# Patient Record
Sex: MALE | Race: WHITE | NOT HISPANIC OR LATINO | Employment: FULL TIME | URBAN - METROPOLITAN AREA
[De-identification: names, ages, dates, MRNs, and addresses within clinical notes are randomized per-mention and may not be internally consistent; named-entity substitution may affect disease eponyms.]

---

## 2018-05-23 DIAGNOSIS — R00.2 HEART PALPITATIONS: Primary | ICD-10-CM

## 2018-05-23 NOTE — PROGRESS NOTES
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

## 2020-12-30 ENCOUNTER — TELEPHONE (OUTPATIENT)
Dept: GASTROENTEROLOGY | Facility: CLINIC | Age: 35
End: 2020-12-30

## 2020-12-30 ENCOUNTER — OFFICE VISIT (OUTPATIENT)
Dept: GASTROENTEROLOGY | Facility: CLINIC | Age: 35
End: 2020-12-30
Payer: COMMERCIAL

## 2020-12-30 VITALS
BODY MASS INDEX: 29.62 KG/M2 | HEIGHT: 69 IN | WEIGHT: 200 LBS | SYSTOLIC BLOOD PRESSURE: 126 MMHG | DIASTOLIC BLOOD PRESSURE: 88 MMHG | HEART RATE: 92 BPM

## 2020-12-30 DIAGNOSIS — K62.5 RECTAL BLEEDING: Primary | ICD-10-CM

## 2020-12-30 DIAGNOSIS — R10.9 ABDOMINAL CRAMPING: ICD-10-CM

## 2020-12-30 PROCEDURE — 99214 OFFICE O/P EST MOD 30 MIN: CPT | Performed by: NURSE PRACTITIONER

## 2020-12-30 RX ORDER — MULTIVITAMIN
1 TABLET ORAL DAILY
COMMUNITY

## 2020-12-30 RX ORDER — SODIUM PICOSULFATE, MAGNESIUM OXIDE, AND ANHYDROUS CITRIC ACID 10; 3.5; 12 MG/160ML; G/160ML; G/160ML
LIQUID ORAL
Qty: 2 BOTTLE | Refills: 0 | Status: SHIPPED | OUTPATIENT
Start: 2020-12-30 | End: 2020-12-30 | Stop reason: SDUPTHER

## 2020-12-30 RX ORDER — SODIUM PICOSULFATE, MAGNESIUM OXIDE, AND ANHYDROUS CITRIC ACID 10; 3.5; 12 MG/160ML; G/160ML; G/160ML
LIQUID ORAL
Qty: 2 BOTTLE | Refills: 0 | Status: SHIPPED | OUTPATIENT
Start: 2020-12-30 | End: 2021-01-04 | Stop reason: HOSPADM

## 2021-01-04 ENCOUNTER — HOSPITAL ENCOUNTER (OUTPATIENT)
Dept: GASTROENTEROLOGY | Facility: AMBULATORY SURGERY CENTER | Age: 36
Discharge: HOME | End: 2021-01-04
Payer: COMMERCIAL

## 2021-01-04 ENCOUNTER — ANESTHESIA EVENT (OUTPATIENT)
Dept: GASTROENTEROLOGY | Facility: AMBULATORY SURGERY CENTER | Age: 36
End: 2021-01-04

## 2021-01-04 ENCOUNTER — ANESTHESIA (OUTPATIENT)
Dept: GASTROENTEROLOGY | Facility: AMBULATORY SURGERY CENTER | Age: 36
End: 2021-01-04

## 2021-01-04 VITALS
SYSTOLIC BLOOD PRESSURE: 115 MMHG | DIASTOLIC BLOOD PRESSURE: 57 MMHG | OXYGEN SATURATION: 100 % | TEMPERATURE: 97.2 F | HEART RATE: 60 BPM | RESPIRATION RATE: 12 BRPM

## 2021-01-04 VITALS — HEART RATE: 71 BPM

## 2021-01-04 DIAGNOSIS — R10.9 ABDOMINAL CRAMPING: ICD-10-CM

## 2021-01-04 DIAGNOSIS — K62.5 RECTAL BLEEDING: ICD-10-CM

## 2021-01-04 PROCEDURE — 45385 COLONOSCOPY W/LESION REMOVAL: CPT | Performed by: INTERNAL MEDICINE

## 2021-01-04 PROCEDURE — 45380 COLONOSCOPY AND BIOPSY: CPT | Performed by: INTERNAL MEDICINE

## 2021-01-04 RX ORDER — SODIUM CHLORIDE 9 MG/ML
50 INJECTION, SOLUTION INTRAVENOUS CONTINUOUS
Status: DISCONTINUED | OUTPATIENT
Start: 2021-01-04 | End: 2021-01-04

## 2021-01-04 RX ORDER — PROPOFOL 10 MG/ML
INJECTION, EMULSION INTRAVENOUS AS NEEDED
Status: DISCONTINUED | OUTPATIENT
Start: 2021-01-04 | End: 2021-01-04

## 2021-01-04 RX ADMIN — SODIUM CHLORIDE 50 ML/HR: 9 INJECTION, SOLUTION INTRAVENOUS at 07:37

## 2021-01-04 RX ADMIN — PROPOFOL 100 MG: 10 INJECTION, EMULSION INTRAVENOUS at 08:11

## 2021-01-04 RX ADMIN — PROPOFOL 50 MG: 10 INJECTION, EMULSION INTRAVENOUS at 08:20

## 2021-01-04 RX ADMIN — PROPOFOL 60 MG: 10 INJECTION, EMULSION INTRAVENOUS at 08:17

## 2021-01-04 RX ADMIN — PROPOFOL 50 MG: 10 INJECTION, EMULSION INTRAVENOUS at 08:23

## 2021-01-04 RX ADMIN — PROPOFOL 60 MG: 10 INJECTION, EMULSION INTRAVENOUS at 08:14

## 2021-01-04 NOTE — DISCHARGE INSTRUCTIONS
Hemorrhoids   WHAT YOU NEED TO KNOW:   What are hemorrhoids? Hemorrhoids are swollen blood vessels inside your rectum (internal hemorrhoids) or on your anus (external hemorrhoids)  Sometimes a hemorrhoid may prolapse  This means it extends out of your anus  What increases my risk for hemorrhoids? · Pregnancy or obesity    · Straining or sitting for a long time during bowel movements    · Liver disease    · Weak muscles around the anus caused by older age, rectal surgery, or anal intercourse    · A lack of physical activity    · Chronic diarrhea or constipation    · A low-fiber diet    What are the signs and symptoms of hemorrhoids? · Pain or itching around your anus or inside your rectum    · Swelling or bumps around your anus    · Bright red blood in your bowel movement, on the toilet paper, or in the toilet bowl    · Tissue bulging out of your anus (prolapsed hemorrhoids)    · Incontinence (poor control over urine or bowel movements)    How are hemorrhoids diagnosed? Your healthcare provider will ask about your symptoms, the foods you eat, and your bowel movements  He or she will examine your anus for external hemorrhoids  You may need the following:  · A digital rectal exam  is a test to check for hemorrhoids  Your healthcare provider will put a gloved finger inside your anus to feel for the hemorrhoids  · An anoscopy  is a test that uses a scope (small tube with a light and camera on the end) to look at your hemorrhoids  How are hemorrhoids treated? Treatment will depend on your symptoms  You may need any of the following:  · Medicines  can help decrease pain and swelling, and soften your bowel movement  The medicine may be a pill, pad, cream, or ointment  · Procedures  may be used to shrink or remove your hemorrhoid  Examples include rubber-band ligation, sclerotherapy, and photocoagulation  These procedures may be done in your healthcare provider's office   Ask your healthcare provider for more information about these procedures  · Surgery  may be needed to shrink or remove your hemorrhoids  How can I manage my symptoms? · Apply ice on your anus for 15 to 20 minutes every hour or as directed  Use an ice pack, or put crushed ice in a plastic bag  Cover it with a towel before you apply it to your anus  Ice helps prevent tissue damage and decreases swelling and pain  · Take a sitz bath  Fill a bathtub with 4 to 6 inches of warm water  You may also use a sitz bath pan that fits inside a toilet bowl  Sit in the sitz bath for 15 minutes  Do this 3 times a day, and after each bowel movement  The warm water can help decrease pain and swelling  · Keep your anal area clean  Gently wash the area with warm water daily  Soap may irritate the area  After a bowel movement, wipe with moist towelettes or wet toilet paper  Dry toilet paper can irritate the area  How can I help prevent hemorrhoids? · Do not strain to have a bowel movement  Do not sit on the toilet too long  These actions can increase pressure on the tissues in your rectum and anus  · Drink plenty of liquids  Liquids can help prevent constipation  Ask how much liquid to drink each day and which liquids are best for you  · Eat a variety of high-fiber foods  Examples include fruits, vegetables, and whole grains  Ask your healthcare provider how much fiber you need each day  You may need to take a fiber supplement  · Exercise as directed  Exercise, such as walking, may make it easier to have a bowel movement  Ask your healthcare provider to help you create an exercise plan  · Do not have anal sex  Anal sex can weaken the skin around your rectum and anus  · Avoid heavy lifting  This can cause straining and increase your risk for another hemorrhoid  When should I seek immediate care? · You have severe pain in your rectum or around your anus      · You have severe pain in your abdomen and you are vomiting  · You have bleeding from your anus that soaks through your underwear  When should I contact my healthcare provider? · You have frequent and painful bowel movements  · Your hemorrhoid looks or feels more swollen than usual      · You do not have a bowel movement for 2 days or more  · You see or feel tissue coming through your anus  · You have questions or concerns about your condition or care  CARE AGREEMENT:   You have the right to help plan your care  Learn about your health condition and how it may be treated  Discuss treatment options with your healthcare providers to decide what care you want to receive  You always have the right to refuse treatment  The above information is an  only  It is not intended as medical advice for individual conditions or treatments  Talk to your doctor, nurse or pharmacist before following any medical regimen to see if it is safe and effective for you  © Copyright [address] Information is for End User's use only and may not be sold, redistributed or otherwise used for commercial purposes  All illustrations and images included in CareNotes® are the copyrighted property of A D A M , Inc  or [address] [place] [address]  Colorectal Polyps   WHAT YOU NEED TO KNOW:   What are colorectal polyps? Colorectal polyps are small growths of tissue in the lining of the colon and rectum  Most polyps are hyperplastic polyps and are usually benign (noncancerous)  Certain types of polyps, called adenomatous polyps, may turn into cancer  What increases my risk of colorectal polyps? The exact cause of colorectal polyps is unknown   The following may increase your risk:  · Older age    · A diet of foods high in fat and low in fiber     · Family history of polyps    · Intestinal diseases, such as Crohn's disease or ulcerative colitis    · An unhealthy lifestyle, such as physical inactivity, smoking, or drinking alcohol    · Obesity    What are the signs and symptoms of colorectal polyps? · Blood in your bowel movement or bleeding from the rectum    · Change in bowel movement habits, such as diarrhea and constipation    · Abdominal pain    How are colorectal polyps diagnosed? You should have fecal blood screening once a year for colorectal disease if you are over 48years old  You should be screened earlier if you have an intestinal disease or a family history of polyps or colorectal cancer  During this screening, a sample of your bowel movement is checked for blood, which may be an early sign of colorectal polyps or cancer  You may also need any of the following tests:  · Digital rectal exam:  Your healthcare provider will examine your anus and use a finger to check your rectum for polyps  · Barium enema: A barium enema is an x-ray of the colon  A tube is put into your anus, and a liquid called barium is put through the tube  Barium is used so that healthcare providers can see your colon better on the x-ray film  · Virtual colonoscopy: This is a CT scan that takes pictures of the inside of your colon and rectum  A small, flexible tube is put into your rectum and air or carbon dioxide (gas) is used to expand your colon  This lets healthcare providers clearly see your colon and any polyps on a monitor  · Colonoscopy or sigmoidoscopy: These procedures help your healthcare provider see the inside of your colon using a flexible tube with a small light and camera on the end  During a sigmoidoscopy, your healthcare provider will only look at rectum and lower colon  During a colonoscopy, healthcare providers will look at the full length of your colon  Healthcare providers may remove a small amount of tissue from the colon for a biopsy  How are colorectal polyps treated? A polypectomy is a minimally invasive procedure to remove your polyps  They may be removed during a colonoscopy or sigmoidoscopy   Your healthcare provider may need to remove the polyps with a laparoscope  Laparoscopy is done by inserting a small, flexible scope into incisions made on your abdomen  What are the risks of colorectal polyps? You may bleed during a colonoscopy procedure  Your bowel may be perforated (torn) when polyps are removed  This may lead to an open abdominal surgery  During surgery, you may bleed too much or get an infection  Adenomatous polyps that are not removed may turn into cancer and become more difficult to treat  Where can I find support and more information? · Wilfrid Choi ([facility])  [address] [address]  Phone: [phone]  Web Address: Gloria Taylor  [facility] n[employer] go[employer]    When should I contact my healthcare provider? · You have a fever  · You have chills, a cough, or feel weak and achy  · You have abdominal pain that does not go away or gets worse after you take medicine  · Your abdomen is swollen  · You are losing weight without trying  · You have questions or concerns about your condition or care  When should I seek immediate care or call 911? · You have sudden shortness of breath  · You have a fast heart rate, fast breathing, or are too dizzy to stand up  · You have severe abdominal pain  · You see blood in your bowel movement  CARE AGREEMENT:   You have the right to help plan your care  Learn about your health condition and how it may be treated  Discuss treatment options with your healthcare providers to decide what care you want to receive  You always have the right to refuse treatment  The above information is an  only  It is not intended as medical advice for individual conditions or treatments  Talk to your doctor, nurse or pharmacist before following any medical regimen to see if it is safe and effective for you    © Copyright [address] Information is for End User's use only and may not be sold, redistributed or otherwise used for commercial purposes   All illustrations and images included in CareNotes® are the copyrighted property of A D A M , Inc  or [facility] Georgie Bradshaw

## 2021-01-04 NOTE — H&P
History and Physical -  [facility]  Tata Maharaj 30 y o  male MRN: [id number]                  HPI: Tata Maharaj is a 30y o  year old male who presents for rectal bleeding  REVIEW OF SYSTEMS: Per the HPI, and otherwise unremarkable  Historical Information   Past Medical History:   Diagnosis Date    Hidradenitis      Past Surgical History:   Procedure Laterality Date    CARPAL TUNNEL RELEASE Bilateral     FOOT SURGERY       Social History   Social History     Substance and Sexual Activity   Alcohol Use Yes    Frequency: Monthly or less     Social History     Substance and Sexual Activity   Drug Use Never     Social History     Tobacco Use   Smoking Status Former Smoker   Smokeless Tobacco Never Used     History reviewed  No pertinent family history  Meds/Allergies     Current Outpatient Medications:     Multiple Vitamin (multivitamin) tablet    Sod Picosulfate-Mag Ox-Cit Acd (Clenpiq) 10-3 5-12 MG-GM -GM/160ML SOLN    Current Facility-Administered Medications:     sodium chloride 0 9 % infusion, 50 mL/hr, Intravenous, Continuous, Continue from Pre-op at 01/04/21 0801    No Known Allergies    Objective     /82   Pulse 66   Temp (!) 97 2 °F (36 2 °C) (Temporal)   Resp 14   SpO2 99%       PHYSICAL EXAM    Gen: NAD AAOx3  CV: S1S2 RRR no m/r/g  CHEST: Clear b/l no c/r/w  ABD: +BS soft, NT/ND  EXT: no edema      ASSESSMENT/PLAN:  This is a 30y o  year old male here for colonoscopy, and he is stable and optimized for his procedure

## 2021-01-04 NOTE — ANESTHESIA PREPROCEDURE EVALUATION
Procedure:  COLONOSCOPY    Relevant Problems   No relevant active problems        Physical Exam    Airway    Mallampati score: II  TM Distance: >3 FB  Neck ROM: full     Dental   No notable dental hx     Cardiovascular  Cardiovascular exam normal    Pulmonary  Pulmonary exam normal     Other Findings        Anesthesia Plan  ASA Score- 2     Anesthesia Type- IV sedation with anesthesia with ASA Monitors  Additional Monitors:   Airway Plan:           Plan Factors-    Chart reviewed  Patient is not a current smoker  Induction- intravenous  Postoperative Plan-     Informed Consent- Anesthetic plan and risks discussed with patient

## 2021-01-04 NOTE — ANESTHESIA POSTPROCEDURE EVALUATION
Post-Op Assessment Note    CV Status:  Stable  Pain Score: 0    Pain management: adequate     Mental Status:  Alert and awake   Hydration Status:  Euvolemic and stable   PONV Controlled:  None   Airway Patency:  Patent      Post Op Vitals Reviewed: Yes      Staff: Anesthesiologist         No complications documented      BP      Temp     Pulse     Resp      SpO2

## 2021-01-06 ENCOUNTER — TELEPHONE (OUTPATIENT)
Dept: GASTROENTEROLOGY | Facility: CLINIC | Age: 36
End: 2021-01-06

## 2021-01-06 NOTE — TELEPHONE ENCOUNTER
Pt had a Colon done on 1/4/21 with GS  Pt needs a 4-6 week follow up  LM on VM for pt to call us back to schedule

## 2021-01-14 LAB
ALBUMIN SERPL-MCNC: 4.6 G/DL (ref 4–5)
ALBUMIN/GLOB SERPL: 2.1 {RATIO} (ref 1.2–2.2)
ALP SERPL-CCNC: 66 IU/L (ref 39–117)
ALT SERPL-CCNC: 25 IU/L (ref 0–44)
AST SERPL-CCNC: 16 IU/L (ref 0–40)
BASOPHILS # BLD AUTO: 0 X10E3/UL (ref 0–0.2)
BASOPHILS NFR BLD AUTO: 1 %
BILIRUB SERPL-MCNC: 0.2 MG/DL (ref 0–1.2)
BUN SERPL-MCNC: 12 MG/DL (ref 6–20)
BUN/CREAT SERPL: 14 (ref 9–20)
CALCIUM SERPL-MCNC: 9.6 MG/DL (ref 8.7–10.2)
CHLORIDE SERPL-SCNC: 101 MMOL/L (ref 96–106)
CO2 SERPL-SCNC: 26 MMOL/L (ref 20–29)
CREAT SERPL-MCNC: 0.86 MG/DL (ref 0.76–1.27)
CRP SERPL-MCNC: <1 MG/L (ref 0–10)
ENDOMYSIUM IGA SER QL: NEGATIVE
EOSINOPHIL # BLD AUTO: 0.1 X10E3/UL (ref 0–0.4)
EOSINOPHIL NFR BLD AUTO: 2 %
ERYTHROCYTE [DISTWIDTH] IN BLOOD BY AUTOMATED COUNT: 12.2 % (ref 11.6–15.4)
ERYTHROCYTE [SEDIMENTATION RATE] IN BLOOD BY WESTERGREN METHOD: 2 MM/HR (ref 0–15)
GLOBULIN SER-MCNC: 2.2 G/DL (ref 1.5–4.5)
GLUCOSE SERPL-MCNC: 104 MG/DL (ref 65–99)
HCT VFR BLD AUTO: 42.5 % (ref 37.5–51)
HGB BLD-MCNC: 15 G/DL (ref 13–17.7)
IGA SERPL-MCNC: 268 MG/DL (ref 90–386)
IMM GRANULOCYTES # BLD: 0 X10E3/UL (ref 0–0.1)
IMM GRANULOCYTES NFR BLD: 0 %
LYMPHOCYTES # BLD AUTO: 1.9 X10E3/UL (ref 0.7–3.1)
LYMPHOCYTES NFR BLD AUTO: 39 %
MCH RBC QN AUTO: 31.2 PG (ref 26.6–33)
MCHC RBC AUTO-ENTMCNC: 35.3 G/DL (ref 31.5–35.7)
MCV RBC AUTO: 88 FL (ref 79–97)
MONOCYTES # BLD AUTO: 0.5 X10E3/UL (ref 0.1–0.9)
MONOCYTES NFR BLD AUTO: 11 %
NEUTROPHILS # BLD AUTO: 2.2 X10E3/UL (ref 1.4–7)
NEUTROPHILS NFR BLD AUTO: 47 %
PLATELET # BLD AUTO: 343 X10E3/UL (ref 150–450)
POTASSIUM SERPL-SCNC: 4.6 MMOL/L (ref 3.5–5.2)
PROT SERPL-MCNC: 6.8 G/DL (ref 6–8.5)
RBC # BLD AUTO: 4.81 X10E6/UL (ref 4.14–5.8)
SL AMB EGFR AFRICAN AMERICAN: 131 ML/MIN/1.73
SL AMB EGFR NON AFRICAN AMERICAN: 113 ML/MIN/1.73
SODIUM SERPL-SCNC: 139 MMOL/L (ref 134–144)
T4 FREE SERPL DIALY-MCNC: 1.5 NG/DL
TSH SERPL-ACNC: 1.3 UU/ML
TTG IGA SER-ACNC: <2 U/ML (ref 0–3)
WBC # BLD AUTO: 4.7 X10E3/UL (ref 3.4–10.8)

## 2021-02-05 ENCOUNTER — OFFICE VISIT (OUTPATIENT)
Dept: GASTROENTEROLOGY | Facility: CLINIC | Age: 36
End: 2021-02-05
Payer: COMMERCIAL

## 2021-02-05 VITALS
SYSTOLIC BLOOD PRESSURE: 122 MMHG | DIASTOLIC BLOOD PRESSURE: 80 MMHG | BODY MASS INDEX: 30.07 KG/M2 | WEIGHT: 203 LBS | HEIGHT: 69 IN

## 2021-02-05 DIAGNOSIS — Z86.010 HISTORY OF COLON POLYPS: ICD-10-CM

## 2021-02-05 DIAGNOSIS — K59.04 CHRONIC IDIOPATHIC CONSTIPATION: Primary | ICD-10-CM

## 2021-02-05 DIAGNOSIS — K62.5 RECTAL BLEEDING: ICD-10-CM

## 2021-02-05 PROCEDURE — 99214 OFFICE O/P EST MOD 30 MIN: CPT | Performed by: INTERNAL MEDICINE

## 2021-02-05 RX ORDER — POLYETHYLENE GLYCOL 3350 17 G/17G
17 POWDER, FOR SOLUTION ORAL DAILY
Qty: 507 G | Refills: 2 | Status: SHIPPED | OUTPATIENT
Start: 2021-02-05

## 2021-02-05 NOTE — PATIENT INSTRUCTIONS
Constipation   WHAT YOU NEED TO KNOW:   Constipation is when you have hard, dry bowel movements, or you go longer than usual between bowel movements  DISCHARGE INSTRUCTIONS:   Call your doctor if:   · You have blood in your bowel movements  · You have a fever and abdominal pain with the constipation  · Your constipation gets worse  · You start to vomit  · You have questions or concerns about your condition or care  Medicines:   · Medicine  such as a laxative may help relax and loosen your intestines to help you have a bowel movement  Your provider may recommend you only use laxatives for a short time  Long-term use may make your bowels dependent on the medicine  · Take your medicine as directed  Contact your healthcare provider if you think your medicine is not helping or if you have side effects  Tell him of her if you are allergic to any medicine  Keep a list of the medicines, vitamins, and herbs you take  Include the amounts, and when and why you take them  Bring the list or the pill bottles to follow-up visits  Carry your medicine list with you in case of an emergency  Relieve constipation:   · A suppository  may be used to help soften your bowel movements  This may make them easier to pass  A suppository is guided into your rectum through your anus  · An enema  is liquid medicine used to clear bowel movement from your rectum  The medicine is put into your rectum through your anus  Prevent constipation:   · Drink liquids as directed  You may need to drink extra liquids to help soften and move your bowels  Ask how much liquid to drink each day and which liquids are best for you  · Eat high-fiber foods  This may help decrease constipation by adding bulk to your bowel movements  High-fiber foods include fruit, vegetables, whole-grain breads and cereals, and beans  Your healthcare provider or dietitian can help you create a high-fiber meal plan   Your provider may also recommend a fiber supplement if you cannot get enough fiber from food  · Exercise regularly  Regular physical activity can help stimulate your intestines  Walking is a good exercise to prevent or relieve constipation  Ask which exercises are best for you  · Schedule a time each day to have a bowel movement  This may help train your body to have regular bowel movements  Bend forward while you are on the toilet to help move the bowel movement out  Sit on the toilet for at least 10 minutes, even if you do not have a bowel movement  · Talk to your healthcare provider about your medicines  Certain medicines, such as opioids, can cause constipation  Your provider may be able to make medicine changes  For example, he or she may change the kind of medicine, or change when you take it  Follow up with your healthcare provider as directed:  Write down your questions so you remember to ask them during your visits  © Copyright [address] Information is for End User's use only and may not be sold, redistributed or otherwise used for commercial purposes  All illustrations and images included in CareNotes® are the copyrighted property of [employer]  or [facility] Georgie Bagley   The above information is an  only  It is not intended as medical advice for individual conditions or treatments  Talk to your doctor, nurse or pharmacist before following any medical regimen to see if it is safe and effective for you

## 2021-02-05 NOTE — PROGRESS NOTES
[id number] - Outpatient Follow-up Note  Dilia Rodrigues 30 y o  male MRN: [id number]  Encounter: [id number]    ASSESSMENT AND PLAN:      1  Chronic idiopathic constipation   56-year-old male with chronic constipation  Colonoscopy unremarkable  At this point, along with the Benefiber, recommend starting MiraLax every day  Increase dietary fiber and water intake  - polyethylene glycol (GLYCOLAX) 17 GM/SCOOP powder; Take 17 g by mouth daily  Dispense: 507 g; Refill: 2    2  Rectal bleeding  Rectal bleeding, colonoscopy unremarkable  This is likely secondary to hemorrhoids  Currently resolved, will continue to monitor  Can do banding if bleeding returns  3  History of colon polyps  Adenoma found on recent colonoscopy  Recall 5 years  Encouraged the patient to also let his first-degree relatives know that they will need a colonoscopy starting 22  Followup Appointment: 6 months  ______________________________________________________________________    Chief Complaint   Patient presents with    Follow-up     HPI:   56-year-old male no significant past medical history who presents today for follow-up  Colonoscopy was otherwise unremarkable except for some hemorrhoids  Still having some constipation issues  Rectal bleeding has resolved  Patient has adjusted his diet and is now eating more dietary fiber as well as drinking more water  Also started some Benefiber  Historical Information   Past Medical History:   Diagnosis Date    Hidradenitis      Past Surgical History:   Procedure Laterality Date    CARPAL TUNNEL RELEASE Bilateral     FOOT SURGERY       Social History     Substance and Sexual Activity   Alcohol Use Yes    Frequency: Monthly or less     Social History     Substance and Sexual Activity   Drug Use Never     Social History     Tobacco Use   Smoking Status Former Smoker   Smokeless Tobacco Never Used     History reviewed   No pertinent family history  Current Outpatient Medications:     Wheat Dextrin (BENEFIBER DRINK MIX PO)    Multiple Vitamin (multivitamin) tablet    polyethylene glycol (GLYCOLAX) 17 GM/SCOOP powder  No Known Allergies  Reviewed medications and allergies and updated as indicated    PHYSICAL EXAM:    Blood pressure 122/80, height 5' 9" (1 753 m), weight 92 1 kg (203 lb)  Body mass index is 29 98 kg/m²  General Appearance: NAD, cooperative, alert  Eyes: Anicteric, PERRLA, EOMI  ENT:  Normocephalic, atraumatic, normal mucosa  Neck:  Supple, symmetrical, trachea midline  Resp:  Clear to auscultation bilaterally; no rales, rhonchi or wheezing; respirations unlabored   CV:  S1 S2, Regular rate and rhythm; no murmur, rub, or gallop  GI:  Soft, non-tender, non-distended; normal bowel sounds; no masses, no organomegaly   Rectal: Deferred  Musculoskeletal: No cyanosis, clubbing or edema  Normal ROM  Skin:  No jaundice, rashes, or lesions   Heme/Lymph: No palpable cervical lymphadenopathy  Psych: Normal affect, good eye contact  Neuro: No gross deficits, AAOx3    Lab Results:   Lab Results   Component Value Date    WBC 4 7 01/06/2021    HGB 15 0 01/06/2021    HCT 42 5 01/06/2021    MCV 88 01/06/2021     01/06/2021     Lab Results   Component Value Date     05/18/2016    K 4 6 01/06/2021     01/06/2021    CO2 26 01/06/2021    BUN 12 01/06/2021    CREATININE 0 86 01/06/2021    CALCIUM 10 1 05/18/2016    AST 16 01/06/2021    ALT 25 01/06/2021    ALKPHOS 73 05/18/2016    PROT 6 8 05/18/2016    BILITOT 0 4 05/18/2016     No results found for: IRON, TIBC, FERRITIN  No results found for: LIPASE    Radiology Results:   No results found

## 2021-02-28 ENCOUNTER — APPOINTMENT (EMERGENCY)
Dept: RADIOLOGY | Facility: HOSPITAL | Age: 36
End: 2021-02-28
Payer: COMMERCIAL

## 2021-02-28 ENCOUNTER — HOSPITAL ENCOUNTER (EMERGENCY)
Facility: HOSPITAL | Age: 36
Discharge: HOME | End: 2021-03-01
Attending: EMERGENCY MEDICINE | Admitting: EMERGENCY MEDICINE
Payer: COMMERCIAL

## 2021-02-28 DIAGNOSIS — R00.2 PALPITATIONS: ICD-10-CM

## 2021-02-28 DIAGNOSIS — F41.9 ANXIETY: ICD-10-CM

## 2021-02-28 DIAGNOSIS — K21.9 GERD (GASTROESOPHAGEAL REFLUX DISEASE): Primary | ICD-10-CM

## 2021-02-28 LAB
BASOPHILS # BLD AUTO: 0.02 THOUSANDS/ΜL (ref 0–0.1)
BASOPHILS NFR BLD AUTO: 0 % (ref 0–1)
EOSINOPHIL # BLD AUTO: 0.11 THOUSAND/ΜL (ref 0–0.61)
EOSINOPHIL NFR BLD AUTO: 2 % (ref 0–6)
ERYTHROCYTE [DISTWIDTH] IN BLOOD BY AUTOMATED COUNT: 12.4 % (ref 11.6–15.1)
HCT VFR BLD AUTO: 45.3 % (ref 36.5–49.3)
HGB BLD-MCNC: 15.2 G/DL (ref 12–17)
IMM GRANULOCYTES # BLD AUTO: 0.01 THOUSAND/UL (ref 0–0.2)
IMM GRANULOCYTES NFR BLD AUTO: 0 % (ref 0–2)
LYMPHOCYTES # BLD AUTO: 3.09 THOUSANDS/ΜL (ref 0.6–4.47)
LYMPHOCYTES NFR BLD AUTO: 44 % (ref 14–44)
MCH RBC QN AUTO: 28.8 PG (ref 26.8–34.3)
MCHC RBC AUTO-ENTMCNC: 33.6 G/DL (ref 31.4–37.4)
MCV RBC AUTO: 86 FL (ref 82–98)
MONOCYTES # BLD AUTO: 0.92 THOUSAND/ΜL (ref 0.17–1.22)
MONOCYTES NFR BLD AUTO: 13 % (ref 4–12)
NEUTROPHILS # BLD AUTO: 2.85 THOUSANDS/ΜL (ref 1.85–7.62)
NEUTS SEG NFR BLD AUTO: 41 % (ref 43–75)
PLATELET # BLD AUTO: 349 THOUSANDS/UL (ref 149–390)
PMV BLD AUTO: 9.7 FL (ref 8.9–12.7)
RBC # BLD AUTO: 5.27 MILLION/UL (ref 3.88–5.62)
WBC # BLD AUTO: 7 THOUSAND/UL (ref 4.31–10.16)

## 2021-02-28 PROCEDURE — U0005 INFEC AGEN DETEC AMPLI PROBE: HCPCS | Performed by: EMERGENCY MEDICINE

## 2021-02-28 PROCEDURE — 93005 ELECTROCARDIOGRAM TRACING: CPT

## 2021-02-28 PROCEDURE — 99285 EMERGENCY DEPT VISIT HI MDM: CPT | Performed by: EMERGENCY MEDICINE

## 2021-02-28 PROCEDURE — 99285 EMERGENCY DEPT VISIT HI MDM: CPT

## 2021-02-28 PROCEDURE — 71045 X-RAY EXAM CHEST 1 VIEW: CPT

## 2021-02-28 PROCEDURE — 84484 ASSAY OF TROPONIN QUANT: CPT | Performed by: EMERGENCY MEDICINE

## 2021-02-28 PROCEDURE — 85025 COMPLETE CBC W/AUTO DIFF WBC: CPT | Performed by: EMERGENCY MEDICINE

## 2021-02-28 PROCEDURE — 36415 COLL VENOUS BLD VENIPUNCTURE: CPT | Performed by: EMERGENCY MEDICINE

## 2021-02-28 PROCEDURE — 80053 COMPREHEN METABOLIC PANEL: CPT | Performed by: EMERGENCY MEDICINE

## 2021-02-28 PROCEDURE — U0003 INFECTIOUS AGENT DETECTION BY NUCLEIC ACID (DNA OR RNA); SEVERE ACUTE RESPIRATORY SYNDROME CORONAVIRUS 2 (SARS-COV-2) (CORONAVIRUS DISEASE [COVID-19]), AMPLIFIED PROBE TECHNIQUE, MAKING USE OF HIGH THROUGHPUT TECHNOLOGIES AS DESCRIBED BY CMS-2020-01-R: HCPCS | Performed by: EMERGENCY MEDICINE

## 2021-03-01 VITALS
HEART RATE: 69 BPM | WEIGHT: 200 LBS | SYSTOLIC BLOOD PRESSURE: 114 MMHG | RESPIRATION RATE: 18 BRPM | BODY MASS INDEX: 29.53 KG/M2 | TEMPERATURE: 97.2 F | DIASTOLIC BLOOD PRESSURE: 79 MMHG | OXYGEN SATURATION: 94 %

## 2021-03-01 LAB
ALBUMIN SERPL BCP-MCNC: 4 G/DL (ref 3.5–5)
ALP SERPL-CCNC: 81 U/L (ref 46–116)
ALT SERPL W P-5'-P-CCNC: 40 U/L (ref 12–78)
ANION GAP SERPL CALCULATED.3IONS-SCNC: 9 MMOL/L (ref 4–13)
AST SERPL W P-5'-P-CCNC: 24 U/L (ref 5–45)
ATRIAL RATE: 71 BPM
BILIRUB SERPL-MCNC: 0.4 MG/DL (ref 0.2–1)
BUN SERPL-MCNC: 18 MG/DL (ref 5–25)
CALCIUM SERPL-MCNC: 8.7 MG/DL (ref 8.3–10.1)
CHLORIDE SERPL-SCNC: 104 MMOL/L (ref 100–108)
CO2 SERPL-SCNC: 27 MMOL/L (ref 21–32)
CREAT SERPL-MCNC: 0.98 MG/DL (ref 0.6–1.3)
GFR SERPL CREATININE-BSD FRML MDRD: 100 ML/MIN/1.73SQ M
GLUCOSE SERPL-MCNC: 103 MG/DL (ref 65–140)
P AXIS: 41 DEGREES
POTASSIUM SERPL-SCNC: 3.4 MMOL/L (ref 3.5–5.3)
PR INTERVAL: 158 MS
PROT SERPL-MCNC: 7.5 G/DL (ref 6.4–8.2)
QRS AXIS: -19 DEGREES
QRSD INTERVAL: 96 MS
QT INTERVAL: 384 MS
QTC INTERVAL: 417 MS
SODIUM SERPL-SCNC: 140 MMOL/L (ref 136–145)
T WAVE AXIS: 50 DEGREES
TROPONIN I SERPL-MCNC: <0.02 NG/ML
VENTRICULAR RATE: 71 BPM

## 2021-03-01 PROCEDURE — 93010 ELECTROCARDIOGRAM REPORT: CPT | Performed by: INTERNAL MEDICINE

## 2021-03-01 RX ORDER — POTASSIUM CHLORIDE 20 MEQ/1
40 TABLET, EXTENDED RELEASE ORAL ONCE
Status: COMPLETED | OUTPATIENT
Start: 2021-03-01 | End: 2021-03-01

## 2021-03-01 RX ADMIN — POTASSIUM CHLORIDE 40 MEQ: 1500 TABLET, EXTENDED RELEASE ORAL at 01:05

## 2021-03-01 NOTE — ED PROVIDER NOTES
History  Chief Complaint   Patient presents with    Shortness of Breath     pt states he has acid reflux for 1 week  and tonight he has that feeling and became sob  pt also states he had a BM and his stool was aron     This is a 61-year-old male presents for evaluation intermittent shortness of breath and palpitations for the past 12 months has a nonproductive cough he is a former smoker  He had a colonoscopy done last month for rectal bleeding which showed external hemorrhoids and polyp no other acute pathology he denies excessive alcohol or anti-inflammatory or anticoagulation use  He complains burning indigestion patient did take Pepto-Bismol last evening and noticed dark stools afterwards  Rectal exam here shows brown stool heme negative  Ambulatory O2 saturation is 97% and lungs are clear on examination normal sinus rhythm on monitor      History provided by:  Patient  Medical Problem  Location:  Epigastric  Quality:  Palpitations and reflux burning with shortness of breath  Severity:  Moderate  Onset quality:  Gradual  Duration:  12 months  Timing:  Intermittent  Chronicity:  Chronic  Context:  Intermittent palpitations and shortness of breath with reflux for the past 12 months   Relieved by:  Nothing  Worsened by: Anxiety  Associated symptoms: abdominal pain (Reflux), cough, fatigue and shortness of breath        Prior to Admission Medications   Prescriptions Last Dose Informant Patient Reported? Taking?    Multiple Vitamin (multivitamin) tablet  Self Yes No   Sig: Take 1 tablet by mouth daily   Wheat Dextrin (BENEFIBER DRINK MIX PO)   Yes No   Sig: Take 2 tablets by mouth   polyethylene glycol (GLYCOLAX) 17 GM/SCOOP powder   No No   Sig: Take 17 g by mouth daily      Facility-Administered Medications: None       Past Medical History:   Diagnosis Date    Acid reflux     Arthritis     Hidradenitis        Past Surgical History:   Procedure Laterality Date    CARPAL TUNNEL RELEASE Bilateral     FOOT SURGERY         History reviewed  No pertinent family history  I have reviewed and agree with the history as documented  E-Cigarette/Vaping    E-Cigarette Use Never User      E-Cigarette/Vaping Substances    Nicotine No     THC No     CBD No     Flavoring No     Other No     Unknown No      Social History     Tobacco Use    Smoking status: Former Smoker    Smokeless tobacco: Never Used   Substance Use Topics    Alcohol use: Yes     Frequency: Monthly or less    Drug use: Never       Review of Systems   Constitutional: Positive for fatigue  Respiratory: Positive for cough and shortness of breath  Cardiovascular: Positive for palpitations  Gastrointestinal: Positive for abdominal pain (Reflux)  Psychiatric/Behavioral: The patient is nervous/anxious  All other systems reviewed and are negative  Physical Exam  Physical Exam  Vitals signs and nursing note reviewed  Constitutional:       General: He is in acute distress (Anxious)  Appearance: He is not ill-appearing, toxic-appearing or diaphoretic  HENT:      Head: Normocephalic and atraumatic  Right Ear: External ear normal       Left Ear: External ear normal       Nose: Nose normal    Eyes:      General: No scleral icterus  Right eye: No discharge  Left eye: No discharge  Extraocular Movements: Extraocular movements intact  Pupils: Pupils are equal, round, and reactive to light  Neck:      Musculoskeletal: Normal range of motion and neck supple  No neck rigidity or muscular tenderness  Cardiovascular:      Rate and Rhythm: Normal rate and regular rhythm  Pulses: Normal pulses  Heart sounds: Normal heart sounds  No murmur  No friction rub  No gallop  Pulmonary:      Effort: Pulmonary effort is normal  No respiratory distress  Breath sounds: Normal breath sounds  No stridor  No wheezing, rhonchi or rales  Abdominal:      General: Abdomen is flat   Bowel sounds are normal  There is no distension  Tenderness: There is no abdominal tenderness  There is no guarding or rebound  Genitourinary:     Rectum: Guaiac result negative  Musculoskeletal: Normal range of motion  General: No swelling, tenderness, deformity or signs of injury  Right lower leg: No edema  Left lower leg: No edema  Skin:     General: Skin is warm and dry  Findings: No rash  Neurological:      General: No focal deficit present  Mental Status: He is alert and oriented to person, place, and time  Cranial Nerves: No cranial nerve deficit  Sensory: No sensory deficit  Coordination: Coordination normal    Psychiatric:         Thought Content:  Thought content normal       Comments: Anxious         Vital Signs  ED Triage Vitals [02/28/21 2310]   Temperature Pulse Respirations Blood Pressure SpO2   (!) 97 2 °F (36 2 °C) 72 18 144/77 98 %      Temp Source Heart Rate Source Patient Position - Orthostatic VS BP Location FiO2 (%)   Temporal Monitor Lying Right arm --      Pain Score       --           Vitals:    02/28/21 2310 02/28/21 2330   BP: 144/77 135/87   Pulse: 72 83   Patient Position - Orthostatic VS: Lying Lying         Visual Acuity      ED Medications  Medications   potassium chloride (K-DUR,KLOR-CON) CR tablet 40 mEq (has no administration in time range)       Diagnostic Studies  Results Reviewed     Procedure Component Value Units Date/Time    Troponin I [[id number]]  (Normal) Collected: 02/28/21 2341    Lab Status: Final result Specimen: Blood from Arm, Left Updated: 03/01/21 0012     Troponin I <0 02 ng/mL     Comprehensive metabolic panel [[id number]]  (Abnormal) Collected: 02/28/21 2341    Lab Status: Final result Specimen: Blood from Arm, Left Updated: 03/01/21 0009     Sodium 140 mmol/L      Potassium 3 4 mmol/L      Chloride 104 mmol/L      CO2 27 mmol/L      ANION GAP 9 mmol/L      BUN 18 mg/dL      Creatinine 0 98 mg/dL      Glucose 103 mg/dL      Calcium 8 7 mg/dL      AST 24 U/L      ALT 40 U/L      Alkaline Phosphatase 81 U/L      Total Protein 7 5 g/dL      Albumin 4 0 g/dL      Total Bilirubin 0 40 mg/dL      eGFR 100 ml/min/1 73sq m     Narrative:      [place] guidelines for Chronic Kidney Disease (CKD):     Stage 1 with normal or high GFR (GFR > 90 mL/min/1 73 square meters)    Stage 2 Mild CKD (GFR = 60-89 mL/min/1 73 square meters)    Stage 3A Moderate CKD (GFR = 45-59 mL/min/1 73 square meters)    Stage 3B Moderate CKD (GFR = 30-44 mL/min/1 73 square meters)    Stage 4 Severe CKD (GFR = 15-29 mL/min/1 73 square meters)    Stage 5 End Stage CKD (GFR <15 mL/min/1 73 square meters)  Note: GFR calculation is accurate only with a steady state creatinine    CBC and differential [[id number]]  (Abnormal) Collected: 02/28/21 2341    Lab Status: Final result Specimen: Blood from Arm, Left Updated: 02/28/21 2352     WBC 7 00 Thousand/uL      RBC 5 27 Million/uL      Hemoglobin 15 2 g/dL      Hematocrit 45 3 %      MCV 86 fL      MCH 28 8 pg      MCHC 33 6 g/dL      RDW 12 4 %      MPV 9 7 fL      Platelets 217 Thousands/uL      Neutrophils Relative 41 %      Immat GRANS % 0 %      Lymphocytes Relative 44 %      Monocytes Relative 13 %      Eosinophils Relative 2 %      Basophils Relative 0 %      Neutrophils Absolute 2 85 Thousands/µL      Immature Grans Absolute 0 01 Thousand/uL      Lymphocytes Absolute 3 09 Thousands/µL      Monocytes Absolute 0 92 Thousand/µL      Eosinophils Absolute 0 11 Thousand/µL      Basophils Absolute 0 02 Thousands/µL     Novel Coronavirus (COVID-19), PCR [facility] [[id number]] Collected: 02/28/21 2341    Lab Status:  In process Specimen: Nasopharyngeal Swab Updated: 02/28/21 2348                 XR chest 1 view portable   ED Interpretation by Daphney Wilson DO (03/01 1726)   No acute infiltrate pneumothorax or congestive heart failure                 Procedures  ECG 12 Lead Documentation Only    Date/Time: 2/28/2021 11:46 PM  Performed by: Michael Maharaj DO  Authorized by: Michael Maharaj DO     ECG reviewed by me, the ED Provider: yes    Patient location:  ED  Rhythm:     Rhythm: sinus rhythm    Conduction:     Conduction: normal    T waves:     T waves: non-specific               ED Course                                           MDM  Number of Diagnoses or Management Options  Diagnosis management comments: Shortness of breath and palpitations differential includes anxiety versus pneumonia COVID-19 anemia workup in progress including EKG chest x-ray and labs  Black stools by report was most likely secondary to him using Pepto-Bismol       Amount and/or Complexity of Data Reviewed  Clinical lab tests: ordered  Tests in the radiology section of CPT®: ordered        Disposition  Final diagnoses:   GERD (gastroesophageal reflux disease)   Palpitations   Anxiety     Time reflects when diagnosis was documented in both MDM as applicable and the Disposition within this note     Time User Action Codes Description Comment    2/28/2021 11:48 PM Naaman Like Add [K21 9] GERD (gastroesophageal reflux disease)     2/28/2021 11:48 PM Naaman Like Add [R00 2] Palpitations     2/28/2021 11:48 PM Naaman Like Add [F41 9] Anxiety       ED Disposition     ED Disposition Condition Date/Time Comment    Discharge Stable Mon Mar 1, 2021 12:44 AM Casper Crowe discharge to home/self care              Follow-up Information     Follow up With Specialties Details Why Contact Info Additional Information    Aleks Menon MD Family Medicine In 2 days  [address]  [place] [phone]        [address] Emergency Department Emergency Medicine  As needed [address] [place], [id number]  [address] [facility] Emergency Department, [address] [place] [address] [address]          Patient's Medications   Discharge Prescriptions    No medications on file     No discharge procedures on file      PDMP Review     None          ED Provider  Electronically Signed by           Daphney Wilson DO  03/01/21 7646

## 2021-03-01 NOTE — ED NOTES
Pt had colonoscopy a few weeks ago due to having blood in stool   Pt sattes he has a scheduled lung ct in few days and states he feels this may be some anxiety     Prudence CRISTIANO Monroy  02/28/21 6578

## 2021-03-02 LAB — SARS-COV-2 RNA SPEC QL NAA+PROBE: NOT DETECTED

## 2021-03-02 NOTE — RESULT ENCOUNTER NOTE
I called Meme John and let him know that his COVID-19 swab was negative  I advised him to continue social distancing procedures

## 2021-09-01 ENCOUNTER — OFFICE VISIT (OUTPATIENT)
Dept: GASTROENTEROLOGY | Facility: CLINIC | Age: 36
End: 2021-09-01
Payer: COMMERCIAL

## 2021-09-01 VITALS
SYSTOLIC BLOOD PRESSURE: 138 MMHG | HEIGHT: 69 IN | WEIGHT: 211 LBS | BODY MASS INDEX: 31.25 KG/M2 | DIASTOLIC BLOOD PRESSURE: 82 MMHG

## 2021-09-01 DIAGNOSIS — K62.5 RECTAL BLEEDING: ICD-10-CM

## 2021-09-01 DIAGNOSIS — K21.9 GASTROESOPHAGEAL REFLUX DISEASE, UNSPECIFIED WHETHER ESOPHAGITIS PRESENT: Primary | ICD-10-CM

## 2021-09-01 DIAGNOSIS — R10.84 GENERALIZED ABDOMINAL PAIN: ICD-10-CM

## 2021-09-01 DIAGNOSIS — Z86.010 HISTORY OF COLON POLYPS: ICD-10-CM

## 2021-09-01 DIAGNOSIS — K59.04 CHRONIC IDIOPATHIC CONSTIPATION: ICD-10-CM

## 2021-09-01 PROCEDURE — 99213 OFFICE O/P EST LOW 20 MIN: CPT | Performed by: NURSE PRACTITIONER

## 2021-09-01 NOTE — H&P (VIEW-ONLY)
[address] [facility] - Outpatient Follow-up Note  Gladys Buckner 30 y o  male MRN: [id number]  Encounter: [id number]    ASSESSMENT AND PLAN:      1  Gastroesophageal reflux disease, unspecified whether esophagitis present  Increased noted acid reflux from esophagus through epigastric area over the past 8 months  He would prefer to try lifestyle changes with his dietary intake and have EGD before requiring to take PPI  DDX:   GERD, esophagitis,  Forrester's  -  Schedule EGD at [address] [place] [address] not eat 2-3 hours prior to bedtime  -  Limit caffeine beverages and alcohol  -  Encourage fluid intake    2  Generalized abdominal pain  He states he has right-sided or left-sided abdominal discomfort periodically and can be sharp or dull  He admits to having a chaotic diet and open for lifestyle/dietary changes  -  Decreased dairy, greasy, fatty, or fried foods    3  Rectal bleeding  Resolved since he began using Miralax post colonoscopy earlier this year  4  Chronic idiopathic constipation  Resolved since initiating Miralax post colonoscopy earlier this year, he has titrated his use of Miralax and states his normal bowel movements are now soft and formed  5  History of colon polyps  Colonoscopy 01/04/2021;  5 and 9 mm polyps biopsied, internal hemorrhoid  Recall 5 years  Followup Appointment: 3-4 months  ______________________________________________________________________    Chief Complaint   Patient presents with    Follow-up     scope      HPI:  29-year-old male seen for  6 months follow-up colonoscopy for chronic idiopathic constipation and rectal bleeding  Colonoscopy 01/04/2021; 9 mm sessile polyp in cecum, 5 mm sessile polyp descending colon, small internal hemorrhoids  Biopsy sent  Patient denies nausea or vomiting  Since colonoscopy he takes Miralax every other day and states his bowel movements can be little chaotic and he contributes to his diet   He denies hematochezia or melena  He does note that he episodically has right or left-sided abdominal discomfort could be sharp can be dull and comes and goes  On exam, abdomen is soft and non-tender  Since colonoscopy he states he is little more aware of his body and states that he notices acid reflux from mid sternum up to his esophagus almost daily and again contributes to his diet  Discussed use of PPI, he would like to try to do food diary awareness and lifestyle changes before having to take a PPI  Discussed EGD and concerned that he has had noted acid reflux for approximately 8 months  Plan to schedule EGD  Historical Information   Past Medical History:   Diagnosis Date    Acid reflux     Arthritis     Hidradenitis      Past Surgical History:   Procedure Laterality Date    CARPAL TUNNEL RELEASE Bilateral     FOOT SURGERY       Social History     Substance and Sexual Activity   Alcohol Use Yes     Social History     Substance and Sexual Activity   Drug Use Never     Social History     Tobacco Use   Smoking Status Former Smoker   Smokeless Tobacco Never Used     History reviewed  No pertinent family history  Current Outpatient Medications:     Multiple Vitamin (multivitamin) tablet    Wheat Dextrin (BENEFIBER DRINK MIX PO)    polyethylene glycol (GLYCOLAX) 17 GM/SCOOP powder  No Known Allergies  Reviewed medications and allergies and updated as indicated    PHYSICAL EXAM:    Blood pressure 138/82, height 5' 9" (1 753 m), weight 95 7 kg (211 lb)  Body mass index is 31 16 kg/m²  General Appearance: NAD, cooperative, alert  Eyes: Anicteric, PERRLA, EOMI  ENT:  Normocephalic, atraumatic, normal mucosa  Neck:  Supple, symmetrical, trachea midline  Resp:  Clear to auscultation bilaterally; no rales, rhonchi or wheezing; respirations unlabored   CV:  S1 S2, Regular rate and rhythm; no murmur, rub, or gallop    GI:  Soft, non-tender, non-distended; normal bowel sounds; no masses, no organomegaly   Rectal: Deferred  Musculoskeletal: No cyanosis, clubbing or edema  Normal ROM  Skin:  No jaundice, rashes, or lesions   Heme/Lymph: No palpable cervical lymphadenopathy  Psych: Normal affect, good eye contact  Neuro: No gross deficits, AAOx3    Lab Results:   Lab Results   Component Value Date    WBC 7 00 02/28/2021    HGB 15 2 02/28/2021    HCT 45 3 02/28/2021    MCV 86 02/28/2021     02/28/2021     Lab Results   Component Value Date     05/18/2016    K 3 4 (L) 02/28/2021     02/28/2021    CO2 27 02/28/2021    BUN 18 02/28/2021    CREATININE 0 98 02/28/2021    CALCIUM 8 7 02/28/2021    AST 24 02/28/2021    ALT 40 02/28/2021    ALKPHOS 81 02/28/2021    PROT 6 8 05/18/2016    BILITOT 0 4 05/18/2016    EGFR 100 02/28/2021     No results found for: IRON, TIBC, FERRITIN  No results found for: LIPASE    Radiology Results:   No results found

## 2021-09-01 NOTE — PROGRESS NOTES
[address] [facility] - Outpatient Follow-up Note  Immanuel Winston 30 y o  male MRN: [id number]  Encounter: [id number]    ASSESSMENT AND PLAN:      1  Gastroesophageal reflux disease, unspecified whether esophagitis present  Increased noted acid reflux from esophagus through epigastric area over the past 8 months  He would prefer to try lifestyle changes with his dietary intake and have EGD before requiring to take PPI  DDX:   GERD, esophagitis,  Forrester's  -  Schedule EGD at [address] [place] [address] not eat 2-3 hours prior to bedtime  -  Limit caffeine beverages and alcohol  -  Encourage fluid intake    2  Generalized abdominal pain  He states he has right-sided or left-sided abdominal discomfort periodically and can be sharp or dull  He admits to having a chaotic diet and open for lifestyle/dietary changes  -  Decreased dairy, greasy, fatty, or fried foods    3  Rectal bleeding  Resolved since he began using Miralax post colonoscopy earlier this year  4  Chronic idiopathic constipation  Resolved since initiating Miralax post colonoscopy earlier this year, he has titrated his use of Miralax and states his normal bowel movements are now soft and formed  5  History of colon polyps  Colonoscopy 01/04/2021;  5 and 9 mm polyps biopsied, internal hemorrhoid  Recall 5 years  Followup Appointment: 3-4 months  ______________________________________________________________________    Chief Complaint   Patient presents with    Follow-up     scope      HPI:  21-year-old male seen for  6 months follow-up colonoscopy for chronic idiopathic constipation and rectal bleeding  Colonoscopy 01/04/2021; 9 mm sessile polyp in cecum, 5 mm sessile polyp descending colon, small internal hemorrhoids  Biopsy sent  Patient denies nausea or vomiting  Since colonoscopy he takes Miralax every other day and states his bowel movements can be little chaotic and he contributes to his diet   He denies hematochezia or melena  He does note that he episodically has right or left-sided abdominal discomfort could be sharp can be dull and comes and goes  On exam, abdomen is soft and non-tender  Since colonoscopy he states he is little more aware of his body and states that he notices acid reflux from mid sternum up to his esophagus almost daily and again contributes to his diet  Discussed use of PPI, he would like to try to do food diary awareness and lifestyle changes before having to take a PPI  Discussed EGD and concerned that he has had noted acid reflux for approximately 8 months  Plan to schedule EGD  Historical Information   Past Medical History:   Diagnosis Date    Acid reflux     Arthritis     Hidradenitis      Past Surgical History:   Procedure Laterality Date    CARPAL TUNNEL RELEASE Bilateral     FOOT SURGERY       Social History     Substance and Sexual Activity   Alcohol Use Yes     Social History     Substance and Sexual Activity   Drug Use Never     Social History     Tobacco Use   Smoking Status Former Smoker   Smokeless Tobacco Never Used     History reviewed  No pertinent family history  Current Outpatient Medications:     Multiple Vitamin (multivitamin) tablet    Wheat Dextrin (BENEFIBER DRINK MIX PO)    polyethylene glycol (GLYCOLAX) 17 GM/SCOOP powder  No Known Allergies  Reviewed medications and allergies and updated as indicated    PHYSICAL EXAM:    Blood pressure 138/82, height 5' 9" (1 753 m), weight 95 7 kg (211 lb)  Body mass index is 31 16 kg/m²  General Appearance: NAD, cooperative, alert  Eyes: Anicteric, PERRLA, EOMI  ENT:  Normocephalic, atraumatic, normal mucosa  Neck:  Supple, symmetrical, trachea midline  Resp:  Clear to auscultation bilaterally; no rales, rhonchi or wheezing; respirations unlabored   CV:  S1 S2, Regular rate and rhythm; no murmur, rub, or gallop    GI:  Soft, non-tender, non-distended; normal bowel sounds; no masses, no organomegaly   Rectal: Deferred  Musculoskeletal: No cyanosis, clubbing or edema  Normal ROM  Skin:  No jaundice, rashes, or lesions   Heme/Lymph: No palpable cervical lymphadenopathy  Psych: Normal affect, good eye contact  Neuro: No gross deficits, AAOx3    Lab Results:   Lab Results   Component Value Date    WBC 7 00 02/28/2021    HGB 15 2 02/28/2021    HCT 45 3 02/28/2021    MCV 86 02/28/2021     02/28/2021     Lab Results   Component Value Date     05/18/2016    K 3 4 (L) 02/28/2021     02/28/2021    CO2 27 02/28/2021    BUN 18 02/28/2021    CREATININE 0 98 02/28/2021    CALCIUM 8 7 02/28/2021    AST 24 02/28/2021    ALT 40 02/28/2021    ALKPHOS 81 02/28/2021    PROT 6 8 05/18/2016    BILITOT 0 4 05/18/2016    EGFR 100 02/28/2021     No results found for: IRON, TIBC, FERRITIN  No results found for: LIPASE    Radiology Results:   No results found

## 2021-09-09 ENCOUNTER — ANESTHESIA (OUTPATIENT)
Dept: GASTROENTEROLOGY | Facility: AMBULATORY SURGERY CENTER | Age: 36
End: 2021-09-09

## 2021-09-09 ENCOUNTER — ANESTHESIA EVENT (OUTPATIENT)
Dept: GASTROENTEROLOGY | Facility: AMBULATORY SURGERY CENTER | Age: 36
End: 2021-09-09

## 2021-09-09 ENCOUNTER — HOSPITAL ENCOUNTER (OUTPATIENT)
Dept: GASTROENTEROLOGY | Facility: AMBULATORY SURGERY CENTER | Age: 36
Discharge: HOME | End: 2021-09-09
Payer: COMMERCIAL

## 2021-09-09 VITALS
DIASTOLIC BLOOD PRESSURE: 62 MMHG | OXYGEN SATURATION: 97 % | TEMPERATURE: 98.2 F | HEART RATE: 57 BPM | SYSTOLIC BLOOD PRESSURE: 105 MMHG | RESPIRATION RATE: 20 BRPM

## 2021-09-09 DIAGNOSIS — R10.84 GENERALIZED ABDOMINAL PAIN: ICD-10-CM

## 2021-09-09 DIAGNOSIS — K21.9 GASTROESOPHAGEAL REFLUX DISEASE, UNSPECIFIED WHETHER ESOPHAGITIS PRESENT: ICD-10-CM

## 2021-09-09 PROCEDURE — 43239 EGD BIOPSY SINGLE/MULTIPLE: CPT | Performed by: INTERNAL MEDICINE

## 2021-09-09 RX ORDER — PROPOFOL 10 MG/ML
INJECTION, EMULSION INTRAVENOUS AS NEEDED
Status: DISCONTINUED | OUTPATIENT
Start: 2021-09-09 | End: 2021-09-09

## 2021-09-09 RX ORDER — SODIUM CHLORIDE 9 MG/ML
INJECTION, SOLUTION INTRAVENOUS CONTINUOUS PRN
Status: DISCONTINUED | OUTPATIENT
Start: 2021-09-09 | End: 2021-09-09

## 2021-09-09 RX ORDER — SODIUM CHLORIDE, SODIUM LACTATE, POTASSIUM CHLORIDE, CALCIUM CHLORIDE 600; 310; 30; 20 MG/100ML; MG/100ML; MG/100ML; MG/100ML
50 INJECTION, SOLUTION INTRAVENOUS CONTINUOUS
Status: DISCONTINUED | OUTPATIENT
Start: 2021-09-09 | End: 2021-09-13 | Stop reason: HOSPADM

## 2021-09-09 RX ADMIN — PROPOFOL 30 MG: 10 INJECTION, EMULSION INTRAVENOUS at 15:06

## 2021-09-09 RX ADMIN — SODIUM CHLORIDE: 9 INJECTION, SOLUTION INTRAVENOUS at 14:52

## 2021-09-09 RX ADMIN — PROPOFOL 30 MG: 10 INJECTION, EMULSION INTRAVENOUS at 15:02

## 2021-09-09 RX ADMIN — PROPOFOL 30 MG: 10 INJECTION, EMULSION INTRAVENOUS at 15:04

## 2021-09-09 RX ADMIN — PROPOFOL 150 MG: 10 INJECTION, EMULSION INTRAVENOUS at 15:01

## 2021-09-09 RX ADMIN — SODIUM CHLORIDE, SODIUM LACTATE, POTASSIUM CHLORIDE, CALCIUM CHLORIDE 50 ML/HR: 600; 310; 30; 20 INJECTION, SOLUTION INTRAVENOUS at 14:23

## 2021-09-09 NOTE — ANESTHESIA POSTPROCEDURE EVALUATION
Post-Op Assessment Note    CV Status:  Stable  Pain Score: 0    Pain management: adequate     Mental Status:  Alert and awake   Hydration Status:  Euvolemic   PONV Controlled:  Controlled   Airway Patency:  Patent      Post Op Vitals Reviewed: Yes      Staff: CRNA         No complications documented      BP   97/59   Temp      Pulse  69   Resp   18   SpO2   97%

## 2021-09-09 NOTE — INTERVAL H&P NOTE
H&P reviewed  After examining the patient I find no changes in the patients condition since the H&P had been written      Vitals:    09/09/21 1409   BP: 132/82   Pulse: 68   Resp: 13   Temp: 98 2 °F (36 8 °C)   SpO2: 96%

## 2021-09-09 NOTE — ANESTHESIA PREPROCEDURE EVALUATION
Procedure:  EGD    Relevant Problems   GI/HEPATIC   (+) Gastroesophageal reflux disease        Physical Exam    Airway    Mallampati score: II  TM Distance: >3 FB  Neck ROM: full     Dental   No notable dental hx     Cardiovascular  Cardiovascular exam normal    Pulmonary  Pulmonary exam normal     Other Findings        Anesthesia Plan  ASA Score- 2     Anesthesia Type- IV sedation with anesthesia with ASA Monitors  Additional Monitors:   Airway Plan:           Plan Factors-    Chart reviewed  Patient is not a current smoker  Induction- intravenous  Postoperative Plan-     Informed Consent- Anesthetic plan and risks discussed with patient  I personally reviewed this patient with the CRNA  Discussed and agreed on the Anesthesia Plan with the CRNA  Yoav Leblanc

## 2021-09-09 NOTE — DISCHARGE INSTRUCTIONS
Upper Endoscopy   WHAT YOU NEED TO KNOW:   An upper endoscopy is also called an upper gastrointestinal (GI) endoscopy, or an esophagogastroduodenoscopy (EGD)  It is a procedure to examine the inside of your esophagus, stomach, and duodenum (first part of the small intestine) with a scope  You may feel bloated, gassy, or have some abdominal discomfort after your procedure  Your throat may be sore for 24 to 36 hours  You may burp or pass gas from air that is still inside your body  DISCHARGE INSTRUCTIONS:   Seek care immediately if:    You have sudden, severe abdominal pain   You have problems swallowing   You have a large amount of black, sticky bowel movements or blood in your bowel movements   You have sudden trouble breathing   You feel weak, lightheaded, or faint or your heart beats faster than normal for you  Contact your healthcare provider if:    You have a fever and chills   You have nausea or are vomiting   Your abdomen is bloated or feels full and hard   You have abdominal pain   You have black, sticky bowel movements or blood in your bowel movements   You have not had a bowel movement for 3 days after your procedure   You have rash or hives   You have questions or concerns about your procedure  Activity:    Do not lift, strain, or run for 24 hours after your procedure   Rest after your procedure  You have been given medicine to relax you  Do not drive or make important decisions until the day after your procedure  Return to your normal activity as directed   Relieve gas and discomfort from bloating by lying on your right side with a heating pad on your abdomen  You may need to take short walks to help the gas move out  Eat small meals until bloating is relieved  Follow up with your healthcare provider as directed: Write down your questions so you remember to ask them during your visits       If you take a blood thinner, please review the specific instructions from your endoscopist about when you should resume it  These can be found in the Recommendation and Your Medication list sections of this After Visit Summary  Esophagitis   WHAT YOU NEED TO KNOW:   What is esophagitis? Esophagitis is inflammation or irritation of the lining of the esophagus  What causes esophagitis? The most common cause is acid reflux  Reflux means stomach acid backs up into your esophagus  The following can also cause esophagitis:  · An infection from bacteria, a virus, or a fungus    · Vomiting or a hiatal hernia    · Medicines such as aspirin or NSAIDs    · Large pills taken without enough water or right before you go to bed    · Cancer treatment, such as radiation    · A toxic object you swallowed, such as a button battery, that gets stuck in your esophagus    · Too much caffeine or acidic or spicy foods    · Smoking cigarettes    What are the signs and symptoms of esophagitis? Signs and symptoms depend on the cause:  · Pain in the middle of your chest that may spread to your back    · Burning or pain in your esophagus, abdominal pain, or indigestion    · Trouble swallowing, or pain when you swallow    · A feeling that something is stuck in your esophagus    · Sore throat, a cough, or hoarseness    · Gagging, drooling, or wheezing    · Mouth sores (white patches), a bad taste in your mouth, or bad breath    · Nausea or vomiting    · Feeding problems or failure to thrive (young children)    How is esophagitis diagnosed? Your healthcare provider will ask about your symptoms and when they started  Tell him or her if anything makes your symptoms worse or better  You may need any of the following:  · Endoscopy  is used to find any tissue changes  A scope is used to see inside the esophagus  A scope is a long, bendable tube with a light on the end  The scope is placed in your mouth and passed down your throat and esophagus   A camera may be hooked to the scope to take pictures  · A barium swallow x-ray  is used to take pictures inside your esophagus  You will swallow barium in a thick liquid before you have the x-ray  The barium helps any injuries show up better on the x-rays  How is esophagitis treated? The goal of treatment is to help the lining of your esophagus heal and to prevent serious complications  Treatment will depend on what is causing your esophagitis  Symptoms caused by a toxic object such as a button battery need immediate treatment  Less severe causes may not need treatment  You may need any of the following if symptoms continue or get worse:  · Medicines  may be given to fight infection or to control stomach acid  Your healthcare provider may make changes to your medicines, such as changing it to a liquid form  · An elimination diet  may help you find foods that are causing your symptoms  You will stop eating certain foods that can cause esophagitis  Your healthcare provider will tell you to start eating them again one at a time  Each time you do not have symptoms, you will start eating another food from the list  Any food that does cause symptoms may be causing your esophagitis  · Surgery  may be needed if other treatments do not work  Part of your stomach can be wrapped to cover the valve between your stomach and esophagus  This helps prevent acid from backing up into your esophagus  What can I do to manage or prevent esophagitis? · Do not smoke  Nicotine and other chemicals in cigarettes and cigars can irritate and damage your esophagus  Ask your healthcare provider for information if you currently smoke and need help to quit  E-cigarettes or smokeless tobacco still contain nicotine  Talk to your healthcare provider before you use these products  · Do not drink alcohol  Alcohol can irritate your esophagus  Talk to your healthcare provider if you need help to stop drinking      · Limit or do not eat foods that can lead to esophagitis  Foods such as oranges and salsa can irritate your esophagus  Caffeine and chocolate can cause acid reflux  High-fat and fried foods make your stomach digest food more slowly  This increases the amount of stomach acid your esophagus is exposed to  Eat small meals, and drink water with your meals  Soft foods such as yogurt and applesauce may help soothe your throat  Do not eat for at least 3 hours before you go to bed  · Drink more liquid when you take pills  Drink a full glass of water when you take your pills  Ask your healthcare provider if you can take your pills at least an hour before you go to bed  · Prevent acid reflux  Do not bend over unless it is necessary  Acid may back up into your esophagus when you bend over  If possible, keep the head of your bed elevated while you sleep  This will help keep acid from backing up  Manage stress  Stress can make your symptoms worse or cause stomach acid to back up  · Keep batteries and similar objects out of the reach of children  Babies often put items in their mouths to explore them  Button batteries are easy to swallow and can cause serious damage  Keep the battery covers of electronic devices such as remote controls taped closed  Store all batteries and toxic materials where children cannot get to them  Use childproof locks to keep children away from dangerous materials  Call your local emergency number (911 in the [facility]) for any of the following:   · You have any of the following signs of a heart attack:      ? Squeezing, pressure, or pain in your chest    ? You may  also have any of the following:     § Discomfort or pain in your back, neck, jaw, stomach, or arm    § Shortness of breath    § Nausea or vomiting    § Lightheadedness or a sudden cold sweat      When should I seek immediate care? · You feel like you have food stuck in your throat and you cannot cough it out  When should I call my doctor?    · You have new or worsening symptoms, even after treatment  · You have questions or concerns about your condition or care  CARE AGREEMENT:   You have the right to help plan your care  Learn about your health condition and how it may be treated  Discuss treatment options with your healthcare providers to decide what care you want to receive  You always have the right to refuse treatment  The above information is an  only  It is not intended as medical advice for individual conditions or treatments  Talk to your doctor, nurse or pharmacist before following any medical regimen to see if it is safe and effective for you  © Copyright [employer] 2021 Information is for End User's use only and may not be sold, redistributed or otherwise used for commercial purposes   All illustrations and images included in CareNotes® are the copyrighted property of A D A M , [employer]  or [address] [place], [place] [address]jelly

## 2021-11-07 ENCOUNTER — HOSPITAL ENCOUNTER (EMERGENCY)
Facility: HOSPITAL | Age: 36
Discharge: HOME | End: 2021-11-08
Attending: EMERGENCY MEDICINE
Payer: COMMERCIAL

## 2021-11-07 VITALS
OXYGEN SATURATION: 98 % | SYSTOLIC BLOOD PRESSURE: 119 MMHG | HEART RATE: 84 BPM | TEMPERATURE: 99.4 F | RESPIRATION RATE: 18 BRPM | HEIGHT: 70 IN | WEIGHT: 206 LBS | DIASTOLIC BLOOD PRESSURE: 71 MMHG | BODY MASS INDEX: 29.49 KG/M2

## 2021-11-07 DIAGNOSIS — J02.9 ACUTE PHARYNGITIS: Primary | ICD-10-CM

## 2021-11-07 DIAGNOSIS — U07.1 COVID: ICD-10-CM

## 2021-11-07 LAB
FLUAV RNA RESP QL NAA+PROBE: NEGATIVE
FLUBV RNA RESP QL NAA+PROBE: NEGATIVE
RSV RNA RESP QL NAA+PROBE: NEGATIVE
S PYO DNA THROAT QL NAA+PROBE: NORMAL
SARS-COV-2 RNA RESP QL NAA+PROBE: POSITIVE

## 2021-11-07 PROCEDURE — 0241U HB NFCT DS VIR RESP RNA 4 TRGT: CPT | Performed by: EMERGENCY MEDICINE

## 2021-11-07 PROCEDURE — 99282 EMERGENCY DEPT VISIT SF MDM: CPT | Performed by: EMERGENCY MEDICINE

## 2021-11-07 PROCEDURE — 99283 EMERGENCY DEPT VISIT LOW MDM: CPT

## 2021-11-07 PROCEDURE — 87651 STREP A DNA AMP PROBE: CPT | Performed by: EMERGENCY MEDICINE

## 2022-06-15 ENCOUNTER — HOSPITAL ENCOUNTER (EMERGENCY)
Facility: HOSPITAL | Age: 37
Discharge: HOME | End: 2022-06-15
Attending: EMERGENCY MEDICINE
Payer: COMMERCIAL

## 2022-06-15 ENCOUNTER — APPOINTMENT (EMERGENCY)
Dept: RADIOLOGY | Facility: HOSPITAL | Age: 37
End: 2022-06-15

## 2022-06-15 VITALS
BODY MASS INDEX: 25.62 KG/M2 | SYSTOLIC BLOOD PRESSURE: 133 MMHG | WEIGHT: 178.57 LBS | TEMPERATURE: 98.2 F | RESPIRATION RATE: 15 BRPM | DIASTOLIC BLOOD PRESSURE: 74 MMHG | OXYGEN SATURATION: 96 % | HEART RATE: 75 BPM

## 2022-06-15 DIAGNOSIS — S61.210A LACERATION OF RIGHT INDEX FINGER: Primary | ICD-10-CM

## 2022-06-15 PROCEDURE — 99282 EMERGENCY DEPT VISIT SF MDM: CPT

## 2022-06-15 PROCEDURE — 73140 X-RAY EXAM OF FINGER(S): CPT

## 2022-06-15 PROCEDURE — 99283 EMERGENCY DEPT VISIT LOW MDM: CPT

## 2022-06-15 PROCEDURE — 12001 RPR S/N/AX/GEN/TRNK 2.5CM/<: CPT

## 2022-06-15 RX ORDER — LIDOCAINE HYDROCHLORIDE 10 MG/ML
5 INJECTION, SOLUTION EPIDURAL; INFILTRATION; INTRACAUDAL; PERINEURAL ONCE
Status: COMPLETED | OUTPATIENT
Start: 2022-06-15 | End: 2022-06-15

## 2022-06-15 RX ORDER — GINSENG 100 MG
1 CAPSULE ORAL ONCE
Status: COMPLETED | OUTPATIENT
Start: 2022-06-15 | End: 2022-06-15

## 2022-06-15 RX ADMIN — BACITRACIN ZINC 1 SMALL APPLICATION: 500 OINTMENT TOPICAL at 10:43

## 2022-06-15 RX ADMIN — LIDOCAINE HYDROCHLORIDE 5 ML: 10 INJECTION, SOLUTION EPIDURAL; INFILTRATION; INTRACAUDAL at 10:43

## 2022-06-15 NOTE — DISCHARGE INSTRUCTIONS
Keep the sutures dry for 2 days   After 2 days you may clean with soap and water to be careful not to pull the stitches out  Use OTC motrin or tylenol for pain relief  Call to schedule an appointment with your PCP for suture removal in one week  Return to the ED if the area starts rebleeding and is uncontrolled with pressure > 10 minutes, your sutures come out, the area becomes red, hot, swollen or has red streaking, you get fevers, or the area had purulent drainage

## 2022-06-15 NOTE — ED PROVIDER NOTES
History  Chief Complaint   Patient presents with    Finger Laceration     To ED with c/o right index finger laceration from a saw while working with wood this morning  30 y/o male presents to ED today with complaints of finger laceration sustained at 8:30 AM this morning at work  He says he was  Using a jagged hand saw when it slipped and went into his right index finger  He states he immediately wrapped it with 's tape to control the bleeding  States his pain is a 4-5/10 and it feels a little tingly  He is able to move the finger and has feeling in it  Denies any weakness or loss of ROM  Tetanus UTD  No other pertinent PMH or allergies  History provided by:  Patient   used: No    Finger Laceration  Location:  Finger  Finger laceration location:  R index finger  Length:  2  Depth: Through dermis  Quality: jagged    Bleeding: controlled    Time since incident:  2 hours  Laceration mechanism:  Metal edge  Pain details:     Quality:  Dull    Severity:  Mild    Timing:  Intermittent    Progression:  Waxing and waning  Foreign body present:  No foreign bodies  Relieved by:  None tried  Tetanus status:  Up to date  Associated symptoms: no fever, no focal weakness, no numbness, no redness, no swelling and no streaking        Prior to Admission Medications   Prescriptions Last Dose Informant Patient Reported? Taking?    Multiple Vitamin (multivitamin) tablet  Self Yes No   Sig: Take 1 tablet by mouth daily   Wheat Dextrin (BENEFIBER DRINK MIX PO)  Self Yes No   Sig: Take 2 tablets by mouth   polyethylene glycol (GLYCOLAX) 17 GM/SCOOP powder  Self No No   Sig: Take 17 g by mouth daily      Facility-Administered Medications: None       Past Medical History:   Diagnosis Date    Acid reflux     Arthritis     GERD (gastroesophageal reflux disease)     Hidradenitis        Past Surgical History:   Procedure Laterality Date    CARPAL TUNNEL RELEASE Bilateral     FOOT SURGERY History reviewed  No pertinent family history  I have reviewed and agree with the history as documented  E-Cigarette/Vaping    E-Cigarette Use Never User      E-Cigarette/Vaping Substances    Nicotine No     THC No     CBD No     Flavoring No     Other No     Unknown No      Social History     Tobacco Use    Smoking status: Former Smoker    Smokeless tobacco: Never Used   Vaping Use    Vaping Use: Never used   Substance Use Topics    Alcohol use: Yes    Drug use: Never       Review of Systems   Constitutional: Negative for chills and fever  HENT: Negative for congestion  Respiratory: Negative for chest tightness and shortness of breath  Cardiovascular: Negative for chest pain  Gastrointestinal: Negative for nausea and vomiting  Skin: Positive for wound  Negative for color change  Neurological: Negative for focal weakness, weakness, numbness and headaches  Psychiatric/Behavioral: Negative for behavioral problems and sleep disturbance  All other systems reviewed and are negative  Physical Exam  Physical Exam  Vitals and nursing note reviewed  Constitutional:       General: He is awake  Appearance: Normal appearance  He is well-developed  HENT:      Head: Normocephalic and atraumatic  Right Ear: External ear normal       Left Ear: External ear normal       Nose: Nose normal    Eyes:      General: No scleral icterus  Extraocular Movements: Extraocular movements intact  Cardiovascular:      Rate and Rhythm: Normal rate and regular rhythm  Heart sounds: Normal heart sounds, S1 normal and S2 normal  No murmur heard  No gallop  Pulmonary:      Effort: Pulmonary effort is normal       Breath sounds: Normal breath sounds  No wheezing, rhonchi or rales  Musculoskeletal:         General: Normal range of motion  Right hand: Tenderness present  Cervical back: Normal range of motion  Comments: Full ROM and strength of right index finger   NV and sensation in tact  Tenderness surrounding laceration   Skin:     General: Skin is warm and dry  Findings: Laceration present  Comments: 2 cm jagged laceration to distal right index finger  Bleeding controlled PTA  No tendon or muscle involvement, through subcutaneous tissue  Neurological:      General: No focal deficit present  Mental Status: He is alert  Psychiatric:         Attention and Perception: Attention and perception normal          Mood and Affect: Mood normal          Behavior: Behavior normal  Behavior is cooperative  Vital Signs  ED Triage Vitals [06/15/22 1010]   Temperature Pulse Respirations Blood Pressure SpO2   98 2 °F (36 8 °C) 71 20 128/74 100 %      Temp Source Heart Rate Source Patient Position - Orthostatic VS BP Location FiO2 (%)   Tympanic Monitor Sitting Right arm --      Pain Score       5           Vitals:    06/15/22 1010 06/15/22 1152   BP: 128/74 133/74   Pulse: 71 75   Patient Position - Orthostatic VS: Sitting Sitting         Visual Acuity      ED Medications  Medications   lidocaine (PF) (XYLOCAINE-MPF) 1 % injection 5 mL (5 mL Infiltration Given 6/15/22 1043)   bacitracin topical ointment 1 small application (1 small application Topical Given 6/15/22 1043)       Diagnostic Studies  Results Reviewed     None                 XR finger second digit-index RIGHT   Final Result by Benjamin Ware MD (06/15 1208)      No acute osseous abnormality  Workstation performed: [id number]                    Procedures  Laceration repair    Date/Time: 6/15/2022 2:38 PM  Performed by: Ml Zambrano PA-C  Authorized by: Ml Zambrano PA-C   Consent: Verbal consent obtained    Body area: upper extremity  Location details: right index finger  Laceration length: 2 cm  Tendon involvement: none  Nerve involvement: none  Vascular damage: no  Anesthesia: local infiltration    Anesthesia:  Local Anesthetic: lidocaine 1% without epinephrine  Anesthetic total: 4 mL      Procedure Details:  Preparation: Patient was prepped and draped in the usual sterile fashion  Irrigation solution: saline  Irrigation method: syringe  Skin closure: 5-0 nylon  Number of sutures: 4  Technique: simple  Approximation: close  Dressing: 4x4 sterile gauze and antibiotic ointment  Patient tolerance: patient tolerated the procedure well with no immediate complications               ED Course             MDM  Number of Diagnoses or Management Options  Laceration of right index finger: new and requires workup  Diagnosis management comments: 30 y/o male here in ED for right index finger laceration  He sustained it 2 hours PTA, bleeding was controlled  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  Tetanus UTD  NV and sensation intact on exam, no muscular, tendon or nerve involvement  Xray ordered to r/o fracture  Negative for acute abnormalities  Laceration was repaired in ED with local anesthetic  Pt was given care instructions for sutures and told to schedule an appointment with his PCP in one week for suture removal  He was given strict return to ED precautions verbally and in discharge papers          Amount and/or Complexity of Data Reviewed  Tests in the radiology section of CPT®: ordered and reviewed    Risk of Complications, Morbidity, and/or Mortality  Presenting problems: low  Diagnostic procedures: minimal  Management options: minimal    Patient Progress  Patient progress: improved      Disposition  Final diagnoses:   Laceration of right index finger     Time reflects when diagnosis was documented in both MDM as applicable and the Disposition within this note     Time User Action Codes Description Comment    6/15/2022 11:30 AM Razia Grissom Add [M44 [id number]] Laceration of right index finger       ED Disposition     ED Disposition   Discharge    Condition   Stable    Date/Time   Wed Michael 15, 2022 11:30 AM    Sherice Olivares discharge to home/self care  Follow-up Information     Follow up With Specialties Details Why Contact Info Additional Information    Ruby Mchugh MD Family Medicine Call in 1 week For suture removal [facility] [address] [facility] Emergency Department Emergency Medicine Go to  if the area starts rebleeding and is uncontrolled with pressure > 10 minutes, your sutures come out, the area becomes red, hot, swollen or has red streaking, you get fevers, or the area had purulent drainage [address] [place] [id number] [phone] [address] Emergency Department, [address] [facility], [facility], [facility]          Discharge Medication List as of 6/15/2022 11:33 AM      CONTINUE these medications which have NOT CHANGED    Details   Multiple Vitamin (multivitamin) tablet Take 1 tablet by mouth daily, Historical Med      polyethylene glycol (GLYCOLAX) 17 GM/SCOOP powder Take 17 g by mouth daily, Starting Fri 2/5/2021, Normal      Wheat Dextrin (BENEFIBER DRINK MIX PO) Take 2 tablets by mouth, Historical Med             No discharge procedures on file      PDMP Review     None          ED Provider  Electronically Signed by           Hanna Farnsworth PA-C  06/15/22 4079

## 2022-06-15 NOTE — Clinical Note
Lo Greendarryn was seen and treated in our emergency department on 6/15/2022  Diagnosis:     Marleen Haque  may return to work on return date  He may return on this date: 06/20/2022         If you have any questions or concerns, please don't hesitate to call        Scotty Zuniga PA-C    ______________________________           _______________          _______________  Hospital Representative                              Date                                Time

## 2022-09-13 ENCOUNTER — OFFICE VISIT (OUTPATIENT)
Dept: URGENT CARE | Facility: CLINIC | Age: 37
End: 2022-09-13
Payer: COMMERCIAL

## 2022-09-13 VITALS
WEIGHT: 178 LBS | BODY MASS INDEX: 25.48 KG/M2 | HEIGHT: 70 IN | HEART RATE: 111 BPM | RESPIRATION RATE: 18 BRPM | TEMPERATURE: 99.8 F | OXYGEN SATURATION: 96 %

## 2022-09-13 DIAGNOSIS — R50.9 FEVER, UNSPECIFIED: Primary | ICD-10-CM

## 2022-09-13 DIAGNOSIS — B34.9 ACUTE VIRAL SYNDROME: ICD-10-CM

## 2022-09-13 PROCEDURE — G0382 LEV 3 HOSP TYPE B ED VISIT: HCPCS | Performed by: PHYSICIAN ASSISTANT

## 2022-09-13 PROCEDURE — 87636 SARSCOV2 & INF A&B AMP PRB: CPT | Performed by: PHYSICIAN ASSISTANT

## 2022-09-13 NOTE — LETTER
[facility] [address] [address]  Dept: [phone]    September 13, 2022    Patient: Jovan Amin  YOB: 1985    Jovan Amin was seen and evaluated at our [facility]  Please note if Covid and Flu tests are negative, they may return to work when fever free for 24 hours without the use of a fever reducing agent  If Covid or Flu test is positive, they may return to work on 9/18/2022, as this is 5 days from the onset of symptoms  Upon return, they must then adhere to strict masking for an additional 5 days      Sincerely,    Lora Vo PA-C

## 2022-09-13 NOTE — PATIENT INSTRUCTIONS
Viral Syndrome   WHAT YOU NEED TO KNOW:   Viral syndrome is a term used for symptoms of an infection caused by a virus  Viruses are spread easily from person to person through the air and on shared items  DISCHARGE INSTRUCTIONS:   Call your local emergency number (911 in the [address] [facility]) or have someone else call if:   You have a seizure  You cannot be woken  You have chest pain or trouble breathing  Return to the emergency department if:   You have a stiff neck, a bad headache, and sensitivity to light  You feel weak, dizzy, or confused  You stop urinating or urinate a lot less than usual     You cough up blood or thick yellow or green mucus  You have severe abdominal pain or your abdomen is larger than usual     Call your doctor if:   Your symptoms do not get better with treatment or get worse after 3 days  You have a rash or ear pain  You have burning when you urinate  You have questions or concerns about your condition or care  Medicines: You may  need any of the following:  Acetaminophen  decreases pain and fever  It is available without a doctor's order  Ask how much to take and how often to take it  Follow directions  Read the labels of all other medicines you are using to see if they also contain acetaminophen, or ask your doctor or pharmacist  Acetaminophen can cause liver damage if not taken correctly  Do not use more than 4 grams (4,000 milligrams) total of acetaminophen in one day  NSAIDs , such as ibuprofen, help decrease swelling, pain, and fever  NSAIDs can cause stomach bleeding or kidney problems in certain people  If you take blood thinner medicine, always ask your healthcare provider if NSAIDs are safe for you  Always read the medicine label and follow directions  Cold medicine  helps decrease swelling, control a cough, and relieve chest or nasal congestion  Saline nasal spray  helps decrease nasal congestion  Take your medicine as directed    Contact your healthcare provider if you think your medicine is not helping or if you have side effects  Tell him of her if you are allergic to any medicine  Keep a list of the medicines, vitamins, and herbs you take  Include the amounts, and when and why you take them  Bring the list or the pill bottles to follow-up visits  Carry your medicine list with you in case of an emergency  Manage your symptoms:   Drink liquids as directed to prevent dehydration  Ask how much liquid to drink each day and which liquids are best for you  Ask if you should drink an oral rehydration solution (ORS)  An ORS has the right amounts of water, salts, and sugar you need to replace body fluids  This may help prevent dehydration caused by vomiting or diarrhea  Do not drink liquids with caffeine  Liquids with caffeine can make dehydration worse  Get plenty of rest to help your body heal   Take naps throughout the day  Ask your healthcare provider when you can return to work and your normal activities  Use a cool mist humidifier to help you breathe easier  Ask your healthcare provider how to use a cool mist humidifier  Eat honey or use cough drops for a sore throat  Cough drops are available without a doctor's order  Follow directions for taking cough drops  Do not smoke or be close to anyone who is smoking  Nicotine and other chemicals in cigarettes and cigars can cause lung damage  Smoking can also delay healing  Ask your healthcare provider for information if you currently smoke and need help to quit  E-cigarettes or smokeless tobacco still contain nicotine  Talk to your healthcare provider before you use these products  Prevent the spread of germs:       Wash your hands often  Wash your hands several times each day  Wash after you use the bathroom, change a child's diaper, and before you prepare or eat food  Use soap and water every time  Rub your soapy hands together, lacing your fingers   Wash the front and back of your hands, and in between your fingers  Use the fingers of one hand to scrub under the fingernails of the other hand  Wash for at least 20 seconds  Rinse with warm, running water for several seconds  Then dry your hands with a clean towel or paper towel  Use hand  that contains alcohol if soap and water are not available  Do not touch your eyes, nose, or mouth without washing your hands first          Cover a sneeze or cough  Use a tissue that covers your mouth and nose  Throw the tissue away in a trash can right away  Use the bend of your arm if a tissue is not available  Wash your hands well with soap and water or use a hand   Stay away from others while you are sick  Avoid crowds as much as possible  Ask about vaccines you may need  Talk to your healthcare provider about your vaccine history  He or she will tell you which vaccines you need, and when to get them  Get the influenza (flu) vaccine as soon as recommended each year  The flu vaccine is available starting in September or October  Flu viruses change, so it is important to get a flu vaccine every year  Get the pneumonia vaccine if recommended  This vaccine is usually recommended every 5 years  Your provider will tell you when to get this vaccine, if needed  Follow up with your doctor as directed:  Write down your questions so you remember to ask them during your visits  © Copyright [employer] 2022 Information is for End User's use only and may not be sold, redistributed or otherwise used for commercial purposes  All illustrations and images included in CareNotes® are the copyrighted property of A D A M [employer]  or Shauna Bagley   The above information is an  only  It is not intended as medical advice for individual conditions or treatments  Talk to your doctor, nurse or pharmacist before following any medical regimen to see if it is safe and effective for you

## 2022-09-13 NOTE — PROGRESS NOTES
[facility]        NAME: Karla Villarreal is a 29 y o  male  : 1985    MRN: [id number]  DATE:  2022  TIME: 3:13 PM    Assessment and Plan   Fever, unspecified [R50 9]  1  Fever, unspecified  Cov/Flu-Col[facility] at [facility]   2  Acute viral syndrome           Patient Instructions     Discussed condition with patient  He/she has recent onset of URI/flu-like symptoms with/without direct exposure to COVID-19  He/she will be swabbed for COVID-19 and flu today and quarantine instructions were given  Will be notified of result once obtained  Rec hydration, rest, discussed OTC cough/cold meds, fever management, and close observation  Should be reevaluated if condition persists/worsens  Follow up with PCP in 3-5 days  Proceed to  ER if symptoms worsen  Chief Complaint     Chief Complaint   Patient presents with    Cold Like Symptoms     Pt reports cold like symptoms, fever, and body ached for one day  Negative at home Covid test today  History of Present Illness       Patient presents with onset in the past day of fever, myalgias, fatigue  He took at home COVID test today which was negative  Denies any recent known direct exposure  Has managed symptoms conservatively since onset  Review of Systems   Review of Systems   Constitutional: Positive for fatigue and fever  HENT: Negative  Respiratory: Negative  Cardiovascular: Negative  Gastrointestinal: Negative  Genitourinary: Negative  Musculoskeletal: Positive for myalgias           Current Medications       Current Outpatient Medications:     Multiple Vitamin (multivitamin) tablet, Take 1 tablet by mouth daily, Disp: , Rfl:     polyethylene glycol (GLYCOLAX) 17 GM/SCOOP powder, Take 17 g by mouth daily, Disp: 507 g, Rfl: 2    Wheat Dextrin (BENEFIBER DRINK MIX PO), Take 2 tablets by mouth, Disp: , Rfl:     Current Allergies     Allergies as of 2022 - Reviewed 2022   Allergen Reaction Noted    Other Rash 02/19/2021            The following portions of the patient's history were reviewed and updated as appropriate: allergies, current medications, past family history, past medical history, past social history, past surgical history and problem list      Past Medical History:   Diagnosis Date    Acid reflux     Arthritis     GERD (gastroesophageal reflux disease)     Hidradenitis        Past Surgical History:   Procedure Laterality Date    CARPAL TUNNEL RELEASE Bilateral     FOOT SURGERY         History reviewed  No pertinent family history  Medications have been verified  Objective   Pulse (!) 111   Temp 99 8 °F (37 7 °C)   Resp 18   Ht 5' 10" (1 778 m)   Wt 80 7 kg (178 lb)   SpO2 96%   BMI 25 54 kg/m²   No LMP for male patient  Physical Exam     Physical Exam  Vitals reviewed  Constitutional:       General: He is not in acute distress  Appearance: He is well-developed  HENT:      Mouth/Throat:      Mouth: Mucous membranes are moist       Pharynx: Oropharynx is clear  Cardiovascular:      Rate and Rhythm: Normal rate and regular rhythm  Heart sounds: Normal heart sounds  No murmur heard  Pulmonary:      Effort: Pulmonary effort is normal  No respiratory distress  Breath sounds: Normal breath sounds  Musculoskeletal:      Cervical back: Neck supple  Lymphadenopathy:      Cervical: No cervical adenopathy  Neurological:      Mental Status: He is alert and oriented to person, place, and time

## 2022-09-14 LAB
FLUAV RNA RESP QL NAA+PROBE: NEGATIVE
FLUBV RNA RESP QL NAA+PROBE: NEGATIVE
SARS-COV-2 RNA RESP QL NAA+PROBE: POSITIVE

## 2024-09-06 PROCEDURE — 88305 TISSUE EXAM BY PATHOLOGIST: CPT | Performed by: STUDENT IN AN ORGANIZED HEALTH CARE EDUCATION/TRAINING PROGRAM

## 2024-09-10 PROCEDURE — 88305 TISSUE EXAM BY PATHOLOGIST: CPT | Performed by: STUDENT IN AN ORGANIZED HEALTH CARE EDUCATION/TRAINING PROGRAM

## 2024-09-18 ENCOUNTER — OFFICE VISIT (OUTPATIENT)
Dept: FAMILY MEDICINE CLINIC | Facility: CLINIC | Age: 39
End: 2024-09-18
Payer: COMMERCIAL

## 2024-09-18 VITALS
RESPIRATION RATE: 16 BRPM | HEIGHT: 70 IN | HEART RATE: 58 BPM | DIASTOLIC BLOOD PRESSURE: 74 MMHG | WEIGHT: 181 LBS | SYSTOLIC BLOOD PRESSURE: 112 MMHG | OXYGEN SATURATION: 98 % | BODY MASS INDEX: 25.91 KG/M2 | TEMPERATURE: 98.4 F

## 2024-09-18 DIAGNOSIS — H04.123 DRY MOUTH AND EYES: ICD-10-CM

## 2024-09-18 DIAGNOSIS — Z13.220 SCREENING, LIPID: ICD-10-CM

## 2024-09-18 DIAGNOSIS — R68.2 DRY MOUTH AND EYES: ICD-10-CM

## 2024-09-18 DIAGNOSIS — M25.50 ARTHRALGIA, UNSPECIFIED JOINT: Primary | ICD-10-CM

## 2024-09-18 DIAGNOSIS — Z23 NEED FOR IMMUNIZATION AGAINST INFLUENZA: ICD-10-CM

## 2024-09-18 DIAGNOSIS — E83.52 HYPERCALCEMIA: ICD-10-CM

## 2024-09-18 DIAGNOSIS — R79.9 ABNORMAL BLOOD CELL COUNT: ICD-10-CM

## 2024-09-18 PROCEDURE — 99204 OFFICE O/P NEW MOD 45 MIN: CPT | Performed by: PHYSICIAN ASSISTANT

## 2024-09-18 PROCEDURE — 90471 IMMUNIZATION ADMIN: CPT

## 2024-09-18 PROCEDURE — 90656 IIV3 VACC NO PRSV 0.5 ML IM: CPT

## 2024-09-18 RX ORDER — PREDNISONE 20 MG/1
40 TABLET ORAL DAILY
Qty: 10 TABLET | Refills: 0 | Status: SHIPPED | OUTPATIENT
Start: 2024-09-18 | End: 2024-09-23

## 2024-09-18 RX ORDER — OMEGA-3/DHA/EPA/FISH OIL 910-1400MG
CAPSULE ORAL
COMMUNITY
Start: 2024-09-02

## 2024-09-18 NOTE — PROGRESS NOTES
Assessment/Plan:    Dry eyes - will order more blood work, see eye doctor, rheum appt 9/30  Dry mouth - on pilocarpine 5 mg qid, has seen dentist, ENT, will see rheum 9/30  Joint pain - more labs ordered  Abnormal CBC - elevated hgb/hct will repeat and order iron studies  Hypercalcemia - repeat Ca, thought to be due to MVI which was stopped    F/u pending labs and as needed    Subjective:   Chief Complaint   Patient presents with    Establish Care    dry mouth     Woke up one morning in July with dry mouth and has not resolved  Getting sores inside mouth   Has followed with ENT       Patient ID: Sanchez Brito is a 38 y.o. male.    Since July patient has noticed an increase in dry mouth and dry eyes. Went to see PCP and was told acid reflux. Tried reflux medication with no improvement. Saw dentist who refer to ENT and did biopsy which was negative for sjogrens.    Dry eyes, dry mouth, change in bowel movements come more frequently without as much notice, joint pain, fatigue. Trouble sleeping due to dry mouth.    Labs from PCP  brought for review.        The following portions of the patient's history were reviewed and updated as appropriate: allergies, current medications, past family history, past medical history, past social history, past surgical history, and problem list.    Past Medical History:   Diagnosis Date    Acid reflux     Arthritis     Asthma     GERD (gastroesophageal reflux disease)     Headache(784.0)     Hidradenitis     Migraine      Past Surgical History:   Procedure Laterality Date    CARPAL TUNNEL RELEASE Bilateral     FOOT SURGERY       Family History   Problem Relation Age of Onset    Cancer Mother     Diabetes Mother     Thyroid disease Mother     Stroke Maternal Uncle      Social History     Socioeconomic History    Marital status: /Civil Union     Spouse name: Not on file    Number of children: Not on file    Years of education: Not on file    Highest education level: Not on file  "  Occupational History    Not on file   Tobacco Use    Smoking status: Former    Smokeless tobacco: Never   Vaping Use    Vaping status: Never Used   Substance and Sexual Activity    Alcohol use: Yes    Drug use: Never    Sexual activity: Not on file   Other Topics Concern    Not on file   Social History Narrative    Not on file     Social Determinants of Health     Financial Resource Strain: Not on file   Food Insecurity: Not on file   Transportation Needs: Not on file   Physical Activity: Not on file   Stress: Not on file   Social Connections: Not on file   Intimate Partner Violence: Not on file   Housing Stability: Not on file       Current Outpatient Medications:     fluticasone (FLONASE) 50 mcg/act nasal spray, 1 spray into each nostril daily, Disp: 48 g, Rfl: 1    Multiple Vitamin (multivitamin) tablet, Take 1 tablet by mouth daily, Disp: , Rfl:     Omega-3 1400 MG CAPS, , Disp: , Rfl:     pantoprazole (PROTONIX) 40 mg tablet, Take 1 tablet (40 mg total) by mouth daily, Disp: 90 tablet, Rfl: 0    PILOCARPINE HCL PO, , Disp: , Rfl:     VITAMIN C, CALCIUM ASCORBATE, PO, , Disp: , Rfl:     Wheat Dextrin (BENEFIBER DRINK MIX PO), Take 2 tablets by mouth, Disp: , Rfl:     ondansetron (ZOFRAN-ODT) 4 mg disintegrating tablet, Take 1 tablet (4 mg total) by mouth every 6 (six) hours as needed for nausea or vomiting, Disp: 20 tablet, Rfl: 0    polyethylene glycol (GLYCOLAX) 17 GM/SCOOP powder, Take 17 g by mouth daily, Disp: 507 g, Rfl: 2    Review of Systems          Objective:    Vitals:    09/18/24 1003   BP: 112/74   Pulse: 58   Resp: 16   Temp: 98.4 °F (36.9 °C)   TempSrc: Temporal   SpO2: 98%   Weight: 82.1 kg (181 lb)   Height: 5' 9.5\" (1.765 m)        Physical Exam  Constitutional:       Appearance: Normal appearance. He is well-developed. He is obese.   HENT:      Head: Normocephalic and atraumatic.   Cardiovascular:      Rate and Rhythm: Normal rate and regular rhythm.      Pulses: Normal pulses.      Heart " sounds: Normal heart sounds.   Pulmonary:      Effort: Pulmonary effort is normal.      Breath sounds: Normal breath sounds.   Musculoskeletal:         General: Normal range of motion.      Cervical back: Normal range of motion and neck supple.   Skin:     General: Skin is warm.   Neurological:      General: No focal deficit present.      Mental Status: He is alert and oriented to person, place, and time.   Psychiatric:         Mood and Affect: Mood normal.         Behavior: Behavior normal.         Thought Content: Thought content normal.         Judgment: Judgment normal.         Depression Screening Follow-up Plan: Patient's depression screening was positive with a PHQ-2 score of 6. Their PHQ-9 score was 19. Clinically patient does not have depression. No treatment is required.

## 2024-09-21 LAB
ANA SER QL: NEGATIVE
BASOPHILS # BLD AUTO: 0 X10E3/UL (ref 0–0.2)
BASOPHILS NFR BLD AUTO: 0 %
C3 SERPL-MCNC: 113 MG/DL (ref 82–167)
C4 SERPL-MCNC: 19 MG/DL (ref 12–38)
CALCIUM SERPL-MCNC: 10.3 MG/DL (ref 8.7–10.2)
CHOLEST SERPL-MCNC: 212 MG/DL (ref 100–199)
CRP SERPL-MCNC: <1 MG/L (ref 0–10)
ENA SS-A AB SER-ACNC: <0.2 AI (ref 0–0.9)
ENA SS-B AB SER-ACNC: <0.2 AI (ref 0–0.9)
EOSINOPHIL # BLD AUTO: 0.1 X10E3/UL (ref 0–0.4)
EOSINOPHIL NFR BLD AUTO: 2 %
ERYTHROCYTE [DISTWIDTH] IN BLOOD BY AUTOMATED COUNT: 12.5 % (ref 11.6–15.4)
FERRITIN SERPL-MCNC: 111 NG/ML (ref 30–400)
HCT VFR BLD AUTO: 49.1 % (ref 37.5–51)
HDLC SERPL-MCNC: 39 MG/DL
HGB BLD-MCNC: 16.4 G/DL (ref 13–17.7)
IMM GRANULOCYTES # BLD: 0 X10E3/UL (ref 0–0.1)
IMM GRANULOCYTES NFR BLD: 0 %
IRON SERPL-MCNC: 93 UG/DL (ref 38–169)
LDLC SERPL CALC-MCNC: 158 MG/DL (ref 0–99)
LDLC/HDLC SERPL: 4.1 RATIO (ref 0–3.6)
LYMPHOCYTES # BLD AUTO: 1.5 X10E3/UL (ref 0.7–3.1)
LYMPHOCYTES NFR BLD AUTO: 29 %
MCH RBC QN AUTO: 29.7 PG (ref 26.6–33)
MCHC RBC AUTO-ENTMCNC: 33.4 G/DL (ref 31.5–35.7)
MCV RBC AUTO: 89 FL (ref 79–97)
MONOCYTES # BLD AUTO: 0.7 X10E3/UL (ref 0.1–0.9)
MONOCYTES NFR BLD AUTO: 13 %
NEUTROPHILS # BLD AUTO: 2.9 X10E3/UL (ref 1.4–7)
NEUTROPHILS NFR BLD AUTO: 56 %
PLATELET # BLD AUTO: 336 X10E3/UL (ref 150–450)
RBC # BLD AUTO: 5.53 X10E6/UL (ref 4.14–5.8)
RHEUMATOID FACT SERPL-ACNC: <10 IU/ML
SL AMB VLDL CHOLESTEROL CALC: 15 MG/DL (ref 5–40)
TRIGL SERPL-MCNC: 82 MG/DL (ref 0–149)
TSH SERPL DL<=0.005 MIU/L-ACNC: 0.66 UIU/ML (ref 0.45–4.5)
TSI SER-ACNC: <0.1 IU/L (ref 0–0.55)
WBC # BLD AUTO: 5.2 X10E3/UL (ref 3.4–10.8)

## 2024-09-26 NOTE — PROGRESS NOTES
Rheumatology Initial Outpatient Visit  Name: Sanchez Brito      : 1985      MRN: [id number]  Encounter Provider: Ching Leon MD  Encounter Date: 2024   Encounter department: [facility] [address] [place] [address]    Assessment & Plan  Sicca, unspecified type (HCC)  38-year-old man who presents for further evaluation of dry mouth, dry eyes, and joint pain.  Workup thus far negative for Sjogren's antibodies and recent salivary gland biopsy with focus score 0 making Sjogren's unlikely.  Symptoms started abruptly in July which would be atypical for a more indolent process like Sjogren's.  Other differentials for sicca complex include IgG4 related disease, sarcoidosis, hepatitis C.  Does not have any risk factors for hepatitis C but will complete workup with serologies.  No clear features of sarcoidosis, but he does complain of intermittent ankle pain and swelling; recently elevated calcium likely iatrogenic due to calcium supplementation.  Will obtain x-ray of the chest to screen for hilar lymphadenopathy.  Similarly, he does not have any clear features of IgG4 related disease such as recurrent pancreatitis or aortitis, but will obtain IgG subclasses.  If positive, will see if pathology can retroactively stain his salivary gland biopsy for IgG4.  I also recommend ophthalmology evaluation as he is complaining of dry eye, pressure sensation around the lacrimal gland (?dacryoadenitis) and visual disturbance.  Orders:    CBC and differential; Future    Comprehensive metabolic panel; Future    C-reactive protein; Future    Sedimentation rate, automated; Future    Hepatitis B core antibody, total; Future    Hepatitis B surface antibody; Future    Hepatitis B surface antigen; Future    Hepatitis C antibody; Future    IgG 1, 2, 3, and 4; Future    CBC and differential    Comprehensive metabolic panel    C-reactive protein    Sedimentation rate, automated    Hepatitis B core antibody, total    Hepatitis B  surface antibody    Hepatitis B surface antigen    Protein / creatinine ratio, urine; Future    Urinalysis with microscopic; Future    XR foot 3+ vw right; Future    XR foot 3+ vw left; Future    XR ankle 3+ vw right; Future    XR ankle 3+ vw left; Future    XR chest pa and lateral; Future    Urinalysis with microscopic    Ambulatory Referral to Ophthalmology; Future    Arthralgia, unspecified joint  Patient reports chronic history of joint pain which is worst in his feet ankles and knees.  He reports symptoms are worst upon first waking and does tend to get better as the day goes on.  Currently, he reports his symptoms are actually much improved since changing his diet which would be atypical for an inflammatory arthritis.  However we will get x-rays of his ankles and feet to screen for any changes consistent with an inflammatory arthritis.         History of Present Illness     Sanchez Brito is a 38 y.o. male who presents for further evaluation of joint pain and sicca symptoms.  Patient reports in July he went on vacation and upon returning he developed sudden onset of dry mouth which was associated with oral ulcerations.  Since then, the dry mouth has continued although the oral ulcerations resolved within about 3 weeks.  He continues to have dryness of the mouth, but this does seem to ebb and flow; sometimes he can have good days where it does not bother him as much as other days.  He has been using lozenges and drinking water along with using mouthwash and special toothpaste but none of this has provided significant relief.  Since then, he has also noticed a dryness sensation in his eyes which was also associated with some visual disturbances.  He notes it seems to be harder to see at a distance more so in the evening and he is also noticed an increase in floaters.  He also reports a pressure sensation along the upper eyelid affecting the left eye.    Along with the symptoms, he has reported intermittent  "arthralgias which predate the onset of the sicca symptoms.  Seems to be worse in the feet, ankles, knees, and mid back.  He notes that his feet and ankles do tend to bother him more in the morning when he first gets up and get better as the day goes on.  He previously noted some intermittent swelling of his knees, though not currently.  His mid back bothers him on and off though does not have any nocturnal awakenings or morning stiffness.  He has noted since he is gone back on the Mediterranean diet his joints have not bothered him very much.    He reports over 20 pound weight loss since his symptoms first started, though he does attribute some of this to not eating as much.      Review of Systems  Complete ROS conducted as per HPI. In addition, denies:  New rash (has history of hidradenitis which has been at baseline)  Muscle weakness  Uveitis  Dactylitis  Shortness of breath  Pleurisy  Gross hematuria, though does note orange discoloration  Foamy urine  Joint issues other than noted above      Objective     /74 (BP Location: Right arm, Patient Position: Sitting, Cuff Size: Standard)   Temp (!) 96.2 °F (35.7 °C) (Tympanic)   Ht 5' 9.5\" (1.765 m)   Wt 83.3 kg (183 lb 9.6 oz)   BMI 26.72 kg/m²     Physical Exam  Physical Exam  Constitutional: well appearing, no acute distress  HEENT: normocephalic, sclera clear, no visible oral or nasal ulcers, saliva present in mouth, no clear proptosis  Neck: supple, no palpable cervical adenopathy  CV: regular rate and rhythm, no murmur  Pulm: normal respiratory effort, lungs clear to auscultation b/l  Skin: no rashes, no skin thickening  Extremities: warm and well perfused, no edema  MSK: No synovitis or effusions; normal range of motion; muscle strength 5 out of 5    Labs and Imaging  I have personally reviewed pertinent labs and imaging.   SHARYN, RF, SSA, SSB negative  C3, C4 nml  CBC, ferritin, TSH nml  Ca 10.3    Salivary gland biopsy:  A. Minor salivary glands, " "\"lip\"; biopsy:       - Approximately fifteen lobules of minor salivary glands present; adequate for evaluation with no significant histopathologic abnormalities, see note      - Focus score 0, not suggestive of Sjogren syndrome    "

## 2024-09-30 ENCOUNTER — CONSULT (OUTPATIENT)
Age: 39
End: 2024-09-30
Payer: COMMERCIAL

## 2024-09-30 ENCOUNTER — APPOINTMENT (OUTPATIENT)
Dept: RADIOLOGY | Facility: CLINIC | Age: 39
End: 2024-09-30
Payer: COMMERCIAL

## 2024-09-30 VITALS
WEIGHT: 183.6 LBS | SYSTOLIC BLOOD PRESSURE: 132 MMHG | BODY MASS INDEX: 26.28 KG/M2 | TEMPERATURE: 96.2 F | HEIGHT: 70 IN | DIASTOLIC BLOOD PRESSURE: 74 MMHG

## 2024-09-30 DIAGNOSIS — M25.50 ARTHRALGIA, UNSPECIFIED JOINT: ICD-10-CM

## 2024-09-30 DIAGNOSIS — M35.00 SICCA, UNSPECIFIED TYPE (HCC): Primary | ICD-10-CM

## 2024-09-30 DIAGNOSIS — M35.00 SICCA, UNSPECIFIED TYPE (HCC): ICD-10-CM

## 2024-09-30 PROCEDURE — 99204 OFFICE O/P NEW MOD 45 MIN: CPT | Performed by: STUDENT IN AN ORGANIZED HEALTH CARE EDUCATION/TRAINING PROGRAM

## 2024-09-30 PROCEDURE — 73610 X-RAY EXAM OF ANKLE: CPT

## 2024-09-30 PROCEDURE — 73630 X-RAY EXAM OF FOOT: CPT

## 2024-09-30 PROCEDURE — 71046 X-RAY EXAM CHEST 2 VIEWS: CPT

## 2024-09-30 RX ORDER — FAMOTIDINE 40 MG/1
TABLET, FILM COATED ORAL EVERY 24 HOURS
COMMUNITY
Start: 2024-07-29

## 2024-09-30 RX ORDER — PREDNISONE 1 MG/1
TABLET ORAL
COMMUNITY
Start: 2024-09-23

## 2024-09-30 NOTE — ASSESSMENT & PLAN NOTE
38-year-old man who presents for further evaluation of dry mouth, dry eyes, and joint pain.  Workup thus far negative for Sjogren's antibodies and recent salivary gland biopsy with focus score 0 making Sjogren's unlikely.  Symptoms started abruptly in July which would be atypical for a more indolent process like Sjogren's.  Other differentials for sicca complex include IgG4 related disease, sarcoidosis, hepatitis C.  Does not have any risk factors for hepatitis C but will complete workup with serologies.  No clear features of sarcoidosis, but he does complain of intermittent ankle pain and swelling; recently elevated calcium likely iatrogenic due to calcium supplementation.  Will obtain x-ray of the chest to screen for hilar lymphadenopathy.  Similarly, he does not have any clear features of IgG4 related disease such as recurrent pancreatitis or aortitis, but will obtain IgG subclasses.  If positive, will see if pathology can retroactively stain his salivary gland biopsy for IgG4.  I also recommend ophthalmology evaluation as he is complaining of dry eye, pressure sensation around the lacrimal gland (?dacryoadenitis) and visual disturbance.  Orders:    CBC and differential; Future    Comprehensive metabolic panel; Future    C-reactive protein; Future    Sedimentation rate, automated; Future    Hepatitis B core antibody, total; Future    Hepatitis B surface antibody; Future    Hepatitis B surface antigen; Future    Hepatitis C antibody; Future    IgG 1, 2, 3, and 4; Future    CBC and differential    Comprehensive metabolic panel    C-reactive protein    Sedimentation rate, automated    Hepatitis B core antibody, total    Hepatitis B surface antibody    Hepatitis B surface antigen    Protein / creatinine ratio, urine; Future    Urinalysis with microscopic; Future    XR foot 3+ vw right; Future    XR foot 3+ vw left; Future    XR ankle 3+ vw right; Future    XR ankle 3+ vw left; Future    XR chest pa and lateral;  Future    Urinalysis with microscopic    Ambulatory Referral to Ophthalmology; Future

## 2024-10-03 ENCOUNTER — PATIENT MESSAGE (OUTPATIENT)
Age: 39
End: 2024-10-03

## 2024-10-03 DIAGNOSIS — E83.52 HYPERCALCEMIA: Primary | ICD-10-CM

## 2024-10-15 DIAGNOSIS — F06.8 ANXIETY DISORDER DUE TO MULTIPLE MEDICAL PROBLEMS: Primary | ICD-10-CM

## 2024-10-15 RX ORDER — SERTRALINE HYDROCHLORIDE 25 MG/1
25 TABLET, FILM COATED ORAL DAILY
Qty: 90 TABLET | Refills: 3 | Status: SHIPPED | OUTPATIENT
Start: 2024-10-15 | End: 2024-10-16 | Stop reason: SDUPTHER

## 2024-10-16 DIAGNOSIS — F06.8 ANXIETY DISORDER DUE TO MULTIPLE MEDICAL PROBLEMS: ICD-10-CM

## 2024-10-16 RX ORDER — SERTRALINE HYDROCHLORIDE 25 MG/1
25 TABLET, FILM COATED ORAL DAILY
Qty: 90 TABLET | Refills: 3 | Status: SHIPPED | OUTPATIENT
Start: 2024-10-16 | End: 2025-10-11

## 2024-10-16 NOTE — PROGRESS NOTES
Telemedicine consent    The patient was identified by name and date of birth. Sanchez Brito was informed that this is a telemedicine visit and that the visit is being conducted through the Epic Embedded platform. He agrees to proceed..  My office door was closed. No one else was in the room.  He acknowledged consent and understanding of privacy and security of the video platform. The patient has agreed to participate and understands they can discontinue the visit at any time.    Patient is aware this is a billable service.     I spent 20 minutes with the patient during this visit.        Rheumatology Follow-up Visit  Name: Sanchez Brito      : 1985      MRN: [id number]  Encounter Provider: Ching Leon MD  Encounter Date: 10/17/2024   Encounter department: [facility] [address] [place] [address]    Assessment & Plan  Sicca, unspecified type (HCC)  38-year-old male who presents for follow-up of acute onset dry mouth and dry eye.  Initial workup for Sjogren's including salivary gland biopsy was negative.  We also screen for Sjogren's mimics such as HCV which was negative and IgG4 related disease which is unlikely with normal IgG subclasses.  Initially he was noted to be hypercalcemic on labs, though after stopping his supplements his most recent ionized calcium had returned to normal.  Workup for etiologies of hypercalcemia including hyperparathyroidism and sarcoidosis was also negative.  Patient is still concerned about his hypercalcemia and requests referral for endocrinology.  In the meantime, I will also place new blood work orders for the patient to get done to recheck his calcium.  Additionally, he notes a lump on his left jaw which is hard and nontender.  Limited evaluation due to video visit, but could be either lymph node or parotid abnormality.  I recommend an ultrasound of the region to further evaluate.  If abnormal finding, could consider biopsy of this.  At this time, there is low suspicion for  a systemic rheumatologic condition as the cause of his symptoms.  I encouraged patient to follow-up with his primary care doctor to discuss next steps in evaluation of his symptoms.  Orders:    US head neck soft tissue; Future    Hypercalcemia    Orders:    Comprehensive metabolic panel; Future    Calcium, ionized; Future    Comprehensive metabolic panel    Calcium, ionized    Ambulatory Referral to Endocrinology; Future      History of Present Illness     Sanchez Brito is a 38 y.o. male who presents for follow-up of sicca symptoms. Workup thus far negative for Sjogren's antibodies and recent salivary gland biopsy with focus score 0 making Sjogren's unlikely. We also obtained labs for IgG4 and hepatitis which can be mimickers of Sjogren's which was negative. Sarcoidosis considered due to hypercalcemia, but this appears to have resolved once patient stopped supplementation; 25-OH, 1,25-OH, normal and CXR without hilar adenopathy.     Patient reports since last visit he has continued to have dryness of his mouth and even down into his throat.  He continues to have dry eye.  He was able to see an eye doctor who confirmed presence of dry eye.  No other concerning abnormalities detected per patient.  He has been sticking to the Mediterranean diet which has made his joints feel a bit better.  However, unfortunately he has been having a lot of anxiety regarding the etiology of his symptoms.    Review of Systems  Complete ROS conducted as per HPI.       Objective     There were no vitals taken for this visit.    Physical Exam  Deferred 2/2 video visit    Labs and Imaging  I have personally reviewed pertinent labs and imaging.

## 2024-10-17 ENCOUNTER — TELEMEDICINE (OUTPATIENT)
Age: 39
End: 2024-10-17
Payer: COMMERCIAL

## 2024-10-17 DIAGNOSIS — E83.52 HYPERCALCEMIA: ICD-10-CM

## 2024-10-17 DIAGNOSIS — M35.00 SICCA, UNSPECIFIED TYPE (HCC): Primary | ICD-10-CM

## 2024-10-17 PROCEDURE — 99213 OFFICE O/P EST LOW 20 MIN: CPT | Performed by: STUDENT IN AN ORGANIZED HEALTH CARE EDUCATION/TRAINING PROGRAM

## 2024-10-17 NOTE — ASSESSMENT & PLAN NOTE
38-year-old male who presents for follow-up of acute onset dry mouth and dry eye.  Initial workup for Sjogren's including salivary gland biopsy was negative.  We also screen for Sjogren's mimics such as HCV which was negative and IgG4 related disease which is unlikely with normal IgG subclasses.  Initially he was noted to be hypercalcemic on labs, though after stopping his supplements his most recent ionized calcium had returned to normal.  Workup for etiologies of hypercalcemia including hyperparathyroidism and sarcoidosis was also negative.  Patient is still concerned about his hypercalcemia and requests referral for endocrinology.  In the meantime, I will also place new blood work orders for the patient to get done to recheck his calcium.  Additionally, he notes a lump on his left jaw which is hard and nontender.  Limited evaluation due to video visit, but could be either lymph node or parotid abnormality.  I recommend an ultrasound of the region to further evaluate.  If abnormal finding, could consider biopsy of this.  At this time, there is low suspicion for a systemic rheumatologic condition as the cause of his symptoms.  I encouraged patient to follow-up with his primary care doctor to discuss next steps in evaluation of his symptoms.  Orders:    US head neck soft tissue; Future

## 2024-10-22 ENCOUNTER — HOSPITAL ENCOUNTER (OUTPATIENT)
Dept: RADIOLOGY | Facility: HOSPITAL | Age: 39
Discharge: HOME | End: 2024-10-22
Attending: STUDENT IN AN ORGANIZED HEALTH CARE EDUCATION/TRAINING PROGRAM
Payer: COMMERCIAL

## 2024-10-22 DIAGNOSIS — M35.00 SICCA, UNSPECIFIED TYPE (HCC): ICD-10-CM

## 2024-10-22 PROCEDURE — 76536 US EXAM OF HEAD AND NECK: CPT

## 2024-10-23 ENCOUNTER — OFFICE VISIT (OUTPATIENT)
Dept: ENDOCRINOLOGY | Facility: HOSPITAL | Age: 39
End: 2024-10-23
Payer: COMMERCIAL

## 2024-10-23 VITALS
WEIGHT: 171.8 LBS | SYSTOLIC BLOOD PRESSURE: 118 MMHG | HEART RATE: 74 BPM | BODY MASS INDEX: 24.6 KG/M2 | HEIGHT: 70 IN | DIASTOLIC BLOOD PRESSURE: 82 MMHG

## 2024-10-23 DIAGNOSIS — R53.83 OTHER FATIGUE: Primary | ICD-10-CM

## 2024-10-23 DIAGNOSIS — R63.4 WEIGHT LOSS: ICD-10-CM

## 2024-10-23 DIAGNOSIS — R00.2 HEART PALPITATIONS: ICD-10-CM

## 2024-10-23 DIAGNOSIS — E83.52 HYPERCALCEMIA: ICD-10-CM

## 2024-10-23 PROCEDURE — 99204 OFFICE O/P NEW MOD 45 MIN: CPT | Performed by: STUDENT IN AN ORGANIZED HEALTH CARE EDUCATION/TRAINING PROGRAM

## 2024-10-23 NOTE — PROGRESS NOTES
10/23/2024    Assessment & Plan        Problem List Items Addressed This Visit    None  Visit Diagnoses       Hypercalcemia                Assessment/Plan:  Patient is a 38yM with Pmhx of GERD, chronic constipation referred to us for elevated calcium.     1) Hypercalcemia- mild and asymptomatic. Corrected calcium for hyperalbuminemia as 9.9 and hence discussed is normal. Patients inozed calcium was normal as well. PTH was appropriately low normal and hence reassured do not believe this is an acute problem. Discussed can follow up with PCP with labs. Patient is very concerned about malignancy which I assured him is not likely with calcium normal, but can order PTHrP for his assurance. Can also check 24hr calcium urine for full workup    2) Fatigue/weight loss- can check testosterone and also full thyroid panel    3) Dry eyes/mouth- do not believe endocrine related    RTC PRN    CC: Hypercalcemia     History of Present Illness     HPI: Sanchez Brito is a 38 y.o. year old male with history of GERD, Chronic constipation, Sicca who was referred to us for elevated total calcium x1 10.6 with albumin 4.9. Patient had further workup with ionized calcium 5.3, PTH 22, VitD 1,25 48, VitD 25 80. Had previous 10.3 09/24. Patient reports he's been having issues with dry mouth, dry eyes for several months, also feeling very ache and his joints hurt. Reports weight loss, anxiety, palpitations and also extreme tiredness. Saw rheum who did workup for sjogren including biopsy and was neg, is on pilocarpine and this isnt helping. Saw ENT as well and had US head/neck ordered.     Kidney stones- neg  Calcium supplement- none, does take vitD supplement  Hydration- reports drinks a lot of water, does report some polyuria. BG on CMP elevated x1 103, rest 92, 97. Sodium normal.     Review of Systems   Constitutional:  Positive for fatigue and unexpected weight change. Negative for chills and fever.   HENT:  Negative for ear pain and sore  throat.    Eyes:  Positive for itching. Negative for pain and visual disturbance.   Respiratory:  Negative for cough and shortness of breath.    Cardiovascular:  Positive for palpitations. Negative for chest pain.   Gastrointestinal:  Negative for abdominal pain and vomiting.   Genitourinary:  Negative for dysuria and hematuria.   Musculoskeletal:  Positive for arthralgias. Negative for back pain.   Skin:  Negative for color change and rash.   Neurological:  Negative for seizures and syncope.   All other systems reviewed and are negative.      Historical Information   Past Medical History:   Diagnosis Date    Acid reflux     Arthritis     Asthma     GERD (gastroesophageal reflux disease)     Headache(784.0)     Hidradenitis     Migraine      Past Surgical History:   Procedure Laterality Date    CARPAL TUNNEL RELEASE Bilateral     FOOT SURGERY       Social History   Social History     Substance and Sexual Activity   Alcohol Use Yes     Social History     Substance and Sexual Activity   Drug Use Not Currently    Types: Marijuana     Social History     Tobacco Use   Smoking Status Former    Current packs/day: 1.50    Average packs/day: 1.5 packs/day for 15.0 years (22.5 ttl pk-yrs)    Types: Cigarettes   Smokeless Tobacco Former    Types: Chew     Family History:   Family History   Problem Relation Age of Onset    Cancer Mother     Diabetes Mother     Thyroid disease Mother     Diabetes type II Mother     Thyroid cancer Mother     Stroke Maternal Uncle     Diabetes type II Maternal Uncle        Meds/Allergies   Current Outpatient Medications   Medication Sig Dispense Refill    PILOCARPINE HCL PO       sertraline (ZOLOFT) 25 mg tablet Take 1 tablet (25 mg total) by mouth daily 90 tablet 3    Wheat Dextrin (BENEFIBER DRINK MIX PO) Take 2 tablets by mouth      famotidine (PEPCID) 40 MG tablet every 24 hours      fluticasone (FLONASE) 50 mcg/act nasal spray 1 spray into each nostril daily 48 g 1    pantoprazole (PROTONIX)  "40 mg tablet Take 1 tablet (40 mg total) by mouth daily 90 tablet 0     No current facility-administered medications for this visit.     Allergies   Allergen Reactions    Poison Ivy Extract Rash       Objective   Vitals: Blood pressure 118/82, pulse 74, height 5' 9.5\" (1.765 m), weight 77.9 kg (171 lb 12.8 oz).  Invasive Devices       None                 Body mass index is 25.01 kg/m².  /82   Pulse 74   Ht 5' 9.5\" (1.765 m)   Wt 77.9 kg (171 lb 12.8 oz)   BMI 25.01 kg/m²    Wt Readings from Last 3 Encounters:   10/23/24 77.9 kg (171 lb 12.8 oz)   09/30/24 83.3 kg (183 lb 9.6 oz)   09/18/24 82.1 kg (181 lb)       GEN: NAD  E/n/m nl facies, hearing intact bilat, tongue midline, lips nl  Eyes: no stare or proptosis, nl lids and conjunctiva, EOMI  Neck: trachea midline, thyroid NT to palpation, nl in size, no nodules or neck masses noted, no cervical LAD  CV; heart reg rate s1s2 nl, no m/r/g appreciated, no MORENO  Resp: CTAB, good effort  Ab+BS  Neuro: no tremor, 2+ DTRs in BUE  MS: no c/c in digits, moves all 4 ext, nl muscle bulk, gait nl  Skin: warm and dry, no palmar erythema  Ext: no c/c in digits, no edema bilaterally, 2+ DP and PT pulses bilat, no breaks in skin/ulcers on feet, sensation intact to monofilament testing on plantar surfaces bilat  Psych: nl mood and affect, no gross lapses in memory    Physical Exam    The history was obtained from the review of the chart and from the patient.    Lab Results: scanned in chart.     No future appointments.  "

## 2024-10-29 DIAGNOSIS — M25.50 ARTHRALGIA, UNSPECIFIED JOINT: Primary | ICD-10-CM

## 2024-10-31 ENCOUNTER — TELEPHONE (OUTPATIENT)
Age: 39
End: 2024-10-31

## 2024-10-31 NOTE — TELEPHONE ENCOUNTER
Patient wife call for her   she stated that [employer]  was unable to process  Patient PTH-releated peptide they need more information about the  provider.  [phone].

## 2024-11-01 ENCOUNTER — TELEPHONE (OUTPATIENT)
Age: 39
End: 2024-11-01

## 2024-11-01 DIAGNOSIS — E83.52 HYPERCALCEMIA: Primary | ICD-10-CM

## 2024-11-01 DIAGNOSIS — F41.1 GAD (GENERALIZED ANXIETY DISORDER): Primary | ICD-10-CM

## 2024-11-01 RX ORDER — CLONAZEPAM 0.5 MG/1
0.5 TABLET ORAL 2 TIMES DAILY PRN
Qty: 60 TABLET | Refills: 0 | Status: SHIPPED | OUTPATIENT
Start: 2024-11-01

## 2024-11-01 NOTE — TELEPHONE ENCOUNTER
I will place a replacement PTH RP for him to have completed.  Otherwise his testosterone, pituitary and thyroid lab work looks good.  His sex hormone binding globulin was just slightly elevated which is reasonable.

## 2024-11-01 NOTE — TELEPHONE ENCOUNTER
Patient called in stating that he had a question regarding his labs. I was unable to reach a team member at this moment. Please call pt back.

## 2024-11-01 NOTE — TELEPHONE ENCOUNTER
I called and left the patient a detailed message concerning his lab test results and that the lab for the  PTH-RP was ordered and is in his chart.  If he has any further questions he is to call the office back.     12:50 pm   The patient did call back and I was able to have Sanchez and Dr. Daniel review blood work together and they stated that his calcium is slightly elevated but that he is not to worry and they will be able to make better recommendations when that patient is able to go for the PTH-RP testing that was reordered for him today.

## 2024-11-06 DIAGNOSIS — R68.2 DRY MOUTH: Primary | ICD-10-CM

## 2024-11-09 DIAGNOSIS — F06.8 ANXIETY DISORDER DUE TO MULTIPLE MEDICAL PROBLEMS: ICD-10-CM

## 2024-11-11 RX ORDER — SERTRALINE HYDROCHLORIDE 25 MG/1
25 TABLET, FILM COATED ORAL DAILY
Qty: 90 TABLET | Refills: 0 | OUTPATIENT
Start: 2024-11-11 | End: 2025-11-06

## 2024-11-12 RX ORDER — PILOCARPINE HYDROCHLORIDE 5 MG/1
5 TABLET, FILM COATED ORAL 3 TIMES DAILY
Qty: 90 TABLET | Refills: 3 | Status: SHIPPED | OUTPATIENT
Start: 2024-11-12

## 2024-11-13 LAB — PTH RELATED PROT SERPL-MCNC: 11 PG/ML (ref 11–20)

## 2024-11-18 ENCOUNTER — TELEPHONE (OUTPATIENT)
Age: 39
End: 2024-11-18

## 2024-11-18 NOTE — TELEPHONE ENCOUNTER
Patient's wife Naomi calling in, asks if provider can review the labs results from 11/5.  Please advise and call Naomi.

## 2024-11-19 NOTE — TELEPHONE ENCOUNTER
Patients wife called to check on lab results. Informed her the message was sent to Dr. Villalta.  Naomi was upset that the message was sent to Dr. Villalta as she knows she is out of the office. She is asking if this can be sent to a different provider who may be covering so they can get the results?  Please advise

## 2024-11-19 NOTE — TELEPHONE ENCOUNTER
PTHrP levels came back normal.  This is usually checked to make sure cancer is not cause for any elevated calcium.

## 2024-11-20 NOTE — TELEPHONE ENCOUNTER
Sex hormone binding globulin was slightly elevated, but testosterone levels were normal.  Does not appear to be any concerns with his testosterone level at this time.

## 2024-11-20 NOTE — TELEPHONE ENCOUNTER
Patients wife Naomi calling back, relayed the above message and she expressed understanding. She asked about the labs done on 10/25. They are in the system. She states those came back abnormal.  Please review and call the patients wife.

## 2024-11-21 NOTE — TELEPHONE ENCOUNTER
Per Dr. Villalta's office note, and what I reviewed, it does not appear he has a calcium issue.  Calcium levels corrected to normal, and ionized calcium levels were normal range.  Parathyroid hormone levels were in normal range.  All and other endocrine disorders she was looking for were normal.  At this time there is no service we can provide in this office.

## 2024-11-21 NOTE — TELEPHONE ENCOUNTER
Patients wife calling back, relayed the above message. She asked what are the next steps moving forward? Does he need a follow up appointment? Did the blood work show hypercalcemia? She has some more questions and stated a [employer] message would be ok for a response or if the provider wants to call her directly to discuss and answer her questions.

## 2024-11-25 DIAGNOSIS — E83.52 HYPERCALCEMIA: Primary | ICD-10-CM

## 2024-12-02 LAB
ALBUMIN MFR UR ELPH: 100 %
ALBUMIN SERPL ELPH-MCNC: 4.6 G/DL (ref 2.9–4.4)
ALBUMIN SERPL-MCNC: 5.1 G/DL (ref 4.1–5.1)
ALBUMIN/GLOB SERPL: 1.6 {RATIO} (ref 0.7–1.7)
ALP SERPL-CCNC: 67 IU/L (ref 44–121)
ALPHA1 GLOB MFR UR ELPH: 0 %
ALPHA1 GLOB SERPL ELPH-MCNC: 0.2 G/DL (ref 0–0.4)
ALPHA2 GLOB MFR UR ELPH: 0 %
ALPHA2 GLOB SERPL ELPH-MCNC: 0.5 G/DL (ref 0.4–1)
ALT SERPL-CCNC: 36 IU/L (ref 0–44)
AST SERPL-CCNC: 19 IU/L (ref 0–40)
B-GLOBULIN MFR UR ELPH: 0 %
B-GLOBULIN SERPL ELPH-MCNC: 1.1 G/DL (ref 0.7–1.3)
BILIRUB SERPL-MCNC: 0.5 MG/DL (ref 0–1.2)
BUN SERPL-MCNC: 8 MG/DL (ref 6–20)
BUN/CREAT SERPL: 10 (ref 9–20)
CA-I SERPL ISE-MCNC: 5.4 MG/DL (ref 4.5–5.6)
CALCIUM SERPL-MCNC: 10.4 MG/DL (ref 8.7–10.2)
CHLORIDE SERPL-SCNC: 101 MMOL/L (ref 96–106)
CO2 SERPL-SCNC: 25 MMOL/L (ref 20–29)
CREAT SERPL-MCNC: 0.78 MG/DL (ref 0.76–1.27)
EGFR: 118 ML/MIN/1.73
GAMMA GLOB MFR UR ELPH: 0 %
GAMMA GLOB SERPL ELPH-MCNC: 0.9 G/DL (ref 0.4–1.8)
GLOBULIN SER CALC-MCNC: 2.8 G/DL (ref 2.2–3.9)
GLOBULIN SER-MCNC: 2.3 G/DL (ref 1.5–4.5)
GLUCOSE SERPL-MCNC: 95 MG/DL (ref 70–99)
LABORATORY COMMENT REPORT: ABNORMAL
LABORATORY COMMENT REPORT: NORMAL
M PROTEIN MFR UR ELPH: NORMAL %
M PROTEIN SERPL ELPH-MCNC: ABNORMAL G/DL
MAGNESIUM SERPL-MCNC: 2.1 MG/DL (ref 1.6–2.3)
PHOSPHATE SERPL-MCNC: 4.3 MG/DL (ref 2.8–4.1)
POTASSIUM SERPL-SCNC: 4.9 MMOL/L (ref 3.5–5.2)
PROT SERPL-MCNC: 7.4 G/DL (ref 6–8.5)
PROT UR-MCNC: 5 MG/DL
PTH-INTACT SERPL-MCNC: 20 PG/ML (ref 15–65)
SODIUM SERPL-SCNC: 141 MMOL/L (ref 134–144)

## 2024-12-05 ENCOUNTER — RESULTS FOLLOW-UP (OUTPATIENT)
Dept: ENDOCRINOLOGY | Facility: HOSPITAL | Age: 39
End: 2024-12-05

## 2024-12-05 DIAGNOSIS — E83.52 HYPERCALCEMIA: Primary | ICD-10-CM

## 2024-12-05 DIAGNOSIS — R63.4 WEIGHT LOSS: ICD-10-CM

## 2024-12-05 DIAGNOSIS — R79.89 ELEVATED PTHRP LEVEL: ICD-10-CM

## 2024-12-05 DIAGNOSIS — R53.83 OTHER FATIGUE: ICD-10-CM

## 2024-12-06 ENCOUNTER — OFFICE VISIT (OUTPATIENT)
Dept: FAMILY MEDICINE CLINIC | Facility: CLINIC | Age: 39
End: 2024-12-06
Payer: COMMERCIAL

## 2024-12-06 VITALS
TEMPERATURE: 98.4 F | BODY MASS INDEX: 24.62 KG/M2 | SYSTOLIC BLOOD PRESSURE: 118 MMHG | OXYGEN SATURATION: 98 % | DIASTOLIC BLOOD PRESSURE: 70 MMHG | HEART RATE: 72 BPM | HEIGHT: 70 IN | RESPIRATION RATE: 16 BRPM | WEIGHT: 172 LBS

## 2024-12-06 DIAGNOSIS — F32.2 SEVERE MAJOR DEPRESSIVE DISORDER (HCC): ICD-10-CM

## 2024-12-06 DIAGNOSIS — J45.20 MILD INTERMITTENT ASTHMA WITHOUT COMPLICATION: ICD-10-CM

## 2024-12-06 DIAGNOSIS — F41.1 GAD (GENERALIZED ANXIETY DISORDER): Primary | ICD-10-CM

## 2024-12-06 DIAGNOSIS — M35.00 SICCA, UNSPECIFIED TYPE (HCC): ICD-10-CM

## 2024-12-06 PROCEDURE — 99214 OFFICE O/P EST MOD 30 MIN: CPT | Performed by: PHYSICIAN ASSISTANT

## 2024-12-06 NOTE — PROGRESS NOTES
Assessment/Plan:    Dry mouth/eyes/sicca - refer to [facility] for second opinion. Has seen rheum and ENT x 2.. dentist, eye doctor    Hypercalcemia - continue with endo    JUVENTINO - continue with zoloft and klonopin    F/u as needed    Subjective:   Chief Complaint   Patient presents with    Follow-up      Patient ID: Sanchez Brito is a 38 y.o. male.    Patient here to review recent testing done by specialist.        The following portions of the patient's history were reviewed and updated as appropriate: allergies, current medications, past family history, past medical history, past social history, past surgical history, and problem list.    Past Medical History:   Diagnosis Date    Acid reflux     Arthritis     Asthma     GERD (gastroesophageal reflux disease)     Headache(784.0)     Hidradenitis     Migraine      Past Surgical History:   Procedure Laterality Date    CARPAL TUNNEL RELEASE Bilateral     FOOT SURGERY       Family History   Problem Relation Age of Onset    Cancer Mother     Diabetes Mother     Thyroid disease Mother     Diabetes type II Mother     Thyroid cancer Mother     Stroke Maternal Uncle     Diabetes type II Maternal Uncle      Social History     Socioeconomic History    Marital status: /Civil Union     Spouse name: Not on file    Number of children: Not on file    Years of education: Not on file    Highest education level: Not on file   Occupational History    Not on file   Tobacco Use    Smoking status: Former     Current packs/day: 1.50     Average packs/day: 1.5 packs/day for 15.0 years (22.5 ttl pk-yrs)     Types: Cigarettes    Smokeless tobacco: Former     Types: Chew   Vaping Use    Vaping status: Never Used   Substance and Sexual Activity    Alcohol use: Yes    Drug use: Not Currently     Types: Marijuana    Sexual activity: Yes     Partners: Female     Birth control/protection: Coitus interruptus   Other Topics Concern    Not on file   Social History Narrative    Not on file     Social  "Drivers of Health     Financial Resource Strain: Not on file   Food Insecurity: Not on file   Transportation Needs: Not on file   Physical Activity: Not on file   Stress: Not on file   Social Connections: Not on file   Intimate Partner Violence: Not on file   Housing Stability: Not on file       Current Outpatient Medications:     clonazePAM (KlonoPIN) 0.5 mg tablet, Take 1 tablet (0.5 mg total) by mouth 2 (two) times a day as needed for anxiety, Disp: 60 tablet, Rfl: 0    pilocarpine (SALAGEN) 5 mg tablet, Take 1 tablet (5 mg total) by mouth 3 (three) times a day, Disp: 90 tablet, Rfl: 3    sertraline (ZOLOFT) 25 mg tablet, Take 1 tablet (25 mg total) by mouth daily, Disp: 90 tablet, Rfl: 3    Wheat Dextrin (BENEFIBER DRINK MIX PO), Take 2 tablets by mouth, Disp: , Rfl:     Review of Systems          Objective:    Vitals:    12/06/24 1418   BP: 118/70   Pulse: 72   Resp: 16   Temp: 98.4 °F (36.9 °C)   TempSrc: Temporal   SpO2: 98%   Weight: 78 kg (172 lb)   Height: 5' 9.5\" (1.765 m)        Physical Exam  Constitutional:       Appearance: Normal appearance.   HENT:      Head: Normocephalic and atraumatic.   Neurological:      General: No focal deficit present.      Mental Status: He is alert and oriented to person, place, and time.   Psychiatric:         Mood and Affect: Mood normal.         Behavior: Behavior normal.         Thought Content: Thought content normal.         Judgment: Judgment normal.               "

## 2024-12-09 DIAGNOSIS — F06.8 ANXIETY DISORDER DUE TO MULTIPLE MEDICAL PROBLEMS: ICD-10-CM

## 2024-12-09 DIAGNOSIS — F41.1 GAD (GENERALIZED ANXIETY DISORDER): ICD-10-CM

## 2024-12-11 RX ORDER — PILOCARPINE HYDROCHLORIDE 5 MG/1
5 TABLET, FILM COATED ORAL 4 TIMES DAILY
Qty: 120 TABLET | Refills: 0 | Status: SHIPPED | OUTPATIENT
Start: 2024-12-11

## 2024-12-11 RX ORDER — CLONAZEPAM 0.5 MG/1
0.5 TABLET ORAL 2 TIMES DAILY PRN
Qty: 60 TABLET | Refills: 0 | Status: SHIPPED | OUTPATIENT
Start: 2024-12-11

## 2024-12-20 DIAGNOSIS — R79.89 ELEVATED PTHRP LEVEL: ICD-10-CM

## 2024-12-20 DIAGNOSIS — E83.39 HYPERPHOSPHATEMIA: ICD-10-CM

## 2024-12-20 DIAGNOSIS — E83.52 HYPERCALCEMIA: Primary | ICD-10-CM

## 2024-12-20 DIAGNOSIS — R53.83 OTHER FATIGUE: ICD-10-CM

## 2024-12-20 DIAGNOSIS — R63.4 WEIGHT LOSS: ICD-10-CM

## 2025-01-05 DIAGNOSIS — F06.8 ANXIETY DISORDER DUE TO MULTIPLE MEDICAL PROBLEMS: ICD-10-CM

## 2025-01-07 RX ORDER — SERTRALINE HYDROCHLORIDE 25 MG/1
25 TABLET, FILM COATED ORAL DAILY
Qty: 90 TABLET | Refills: 1 | Status: SHIPPED | OUTPATIENT
Start: 2025-01-07 | End: 2025-07-06

## 2025-01-07 RX ORDER — PILOCARPINE HYDROCHLORIDE 5 MG/1
10 TABLET, FILM COATED ORAL 3 TIMES DAILY
Qty: 540 TABLET | Refills: 1 | Status: SHIPPED | OUTPATIENT
Start: 2025-01-07

## 2025-01-28 DIAGNOSIS — M35.00 SICCA, UNSPECIFIED TYPE (HCC): Primary | ICD-10-CM

## 2025-02-07 DIAGNOSIS — F41.1 GAD (GENERALIZED ANXIETY DISORDER): ICD-10-CM

## 2025-02-10 RX ORDER — CLONAZEPAM 0.5 MG/1
0.5 TABLET ORAL 2 TIMES DAILY PRN
Qty: 60 TABLET | Refills: 0 | Status: SHIPPED | OUTPATIENT
Start: 2025-02-10

## 2025-03-18 DIAGNOSIS — F41.1 GAD (GENERALIZED ANXIETY DISORDER): ICD-10-CM

## 2025-03-19 DIAGNOSIS — F06.8 ANXIETY DISORDER DUE TO MULTIPLE MEDICAL PROBLEMS: ICD-10-CM

## 2025-03-19 RX ORDER — CLONAZEPAM 0.5 MG/1
0.5 TABLET ORAL 2 TIMES DAILY PRN
Qty: 60 TABLET | Refills: 0 | Status: SHIPPED | OUTPATIENT
Start: 2025-03-19

## 2025-03-20 RX ORDER — PILOCARPINE HYDROCHLORIDE 5 MG/1
10 TABLET, FILM COATED ORAL 3 TIMES DAILY
Qty: 540 TABLET | Refills: 0 | Status: SHIPPED | OUTPATIENT
Start: 2025-03-20

## 2025-03-20 NOTE — TELEPHONE ENCOUNTER
Sanchez called wanting to know the status of this refill he is requesting, advised that it still needed to be reviewed by the provider. Was requesting refill on pilocarpine 5mg tablet and to send to [facility] in [facility].    Please review and send the prescription to the [facility] pharmacy that he is requesting.     Please advise out to Sanchez to inform that the prescription has been sent over.

## 2025-04-04 DIAGNOSIS — F06.8 ANXIETY DISORDER DUE TO MULTIPLE MEDICAL PROBLEMS: ICD-10-CM

## 2025-04-04 DIAGNOSIS — F41.1 GAD (GENERALIZED ANXIETY DISORDER): ICD-10-CM

## 2025-04-04 RX ORDER — SERTRALINE HYDROCHLORIDE 25 MG/1
25 TABLET, FILM COATED ORAL DAILY
Qty: 90 TABLET | Refills: 0 | Status: SHIPPED | OUTPATIENT
Start: 2025-04-04 | End: 2025-10-01

## 2025-04-04 RX ORDER — CLONAZEPAM 0.5 MG/1
0.5 TABLET ORAL 2 TIMES DAILY PRN
Qty: 60 TABLET | Refills: 0 | Status: SHIPPED | OUTPATIENT
Start: 2025-04-04

## 2025-04-04 RX ORDER — PILOCARPINE HYDROCHLORIDE 5 MG/1
10 TABLET, FILM COATED ORAL 3 TIMES DAILY
Qty: 540 TABLET | Refills: 0 | Status: SHIPPED | OUTPATIENT
Start: 2025-04-04

## 2025-05-20 DIAGNOSIS — F41.1 GAD (GENERALIZED ANXIETY DISORDER): ICD-10-CM

## 2025-05-21 RX ORDER — CLONAZEPAM 0.5 MG/1
0.5 TABLET ORAL 2 TIMES DAILY PRN
Qty: 60 TABLET | Refills: 0 | Status: SHIPPED | OUTPATIENT
Start: 2025-05-21

## 2025-06-02 ENCOUNTER — OFFICE VISIT (OUTPATIENT)
Dept: FAMILY MEDICINE CLINIC | Facility: CLINIC | Age: 40
End: 2025-06-02
Payer: COMMERCIAL

## 2025-06-02 VITALS
TEMPERATURE: 98.6 F | HEART RATE: 74 BPM | DIASTOLIC BLOOD PRESSURE: 92 MMHG | SYSTOLIC BLOOD PRESSURE: 130 MMHG | OXYGEN SATURATION: 98 % | BODY MASS INDEX: 27.59 KG/M2 | WEIGHT: 192.7 LBS | HEIGHT: 70 IN | RESPIRATION RATE: 15 BRPM

## 2025-06-02 DIAGNOSIS — K11.7 XEROSTOMIA: ICD-10-CM

## 2025-06-02 DIAGNOSIS — R51.9 CHRONIC NONINTRACTABLE HEADACHE, UNSPECIFIED HEADACHE TYPE: Primary | ICD-10-CM

## 2025-06-02 DIAGNOSIS — G89.29 CHRONIC NONINTRACTABLE HEADACHE, UNSPECIFIED HEADACHE TYPE: Primary | ICD-10-CM

## 2025-06-02 DIAGNOSIS — M35.00 SICCA, UNSPECIFIED TYPE (HCC): ICD-10-CM

## 2025-06-02 PROCEDURE — 99214 OFFICE O/P EST MOD 30 MIN: CPT | Performed by: NURSE PRACTITIONER

## 2025-06-02 RX ORDER — ACETAMINOPHEN, ASPIRIN, AND CAFFEINE 250; 250; 65 MG/1; MG/1; MG/1
TABLET, FILM COATED ORAL
COMMUNITY

## 2025-06-02 NOTE — PROGRESS NOTES
Name: Sanchez Brito      : 1985      MRN: [id number]  Encounter Provider: JUDI Hodgson  Encounter Date: 2025   Encounter department: [facility]  :  Assessment & Plan  Chronic nonintractable headache, unspecified headache type    Orders:    MRI brain and orbits wo and w contrast; Future    Check MRI for further evaluation of headaches and changes in vision (occasional double vision per pt report). Pt notes he will have this done in [place]. We will contact pt w/ results once available. Patient is encouraged to call our office for any questions/concerns, persistent or worsening symptoms. Patient states they understand and agree with treatment plan.   Sicca, unspecified type (HCC)       Unclear etiology. Pt is on Pilocarpine from his previous PCP at [facility]. He has seen several specialists including Rheumatology, Endocrinology, ENT without any clear answers. He was previously encouraged to seek care from [facility] which he is considering as well as [place].   Xerostomia       Plan as above.        Pt to f/u PRN         History of Present Illness   Pt presents today for dry throat that started on 2024.  He notes weight loss, brain fog, joint pain, muscle aches as well.  He notes a mediterranean diet can help his symptoms.  Patient has seen 2 ENTs, 3 ophthalmologists, a rheumatologist locally.  He is currently living between [place] and [facility], so he has also being seen by various specialists in [place].  He is taking Pilocarpine which was prescribed by his previous PCP at [facility].  Pt believes this has made a great positive improvement in his symptoms, but he still suffers from the dry eye and dry mouth.  He is looking to get into either [facility] or [facility] for further evaluation since he was worked up by Rheumatology, Endocrinology and ENT without clear cause of his symptoms.  He noted he had full body xray as well as CT  "chest/abdomen/pelvis done at [facility] which he notes were normal.  He is also noting headaches as well as tunnel vision (x1 occurrence), floaters as well as light sensitivity.  He also notes headaches to kim temples and top of scalp.  He notes improvement w/ Excedrin.        Review of Systems  As noted per HPI.  Objective   /92   Pulse 74   Temp 98.6 °F (37 °C)   Resp 15   Ht 5' 9.5\" (1.765 m)   Wt 87.4 kg (192 lb 11.2 oz)   SpO2 98%   BMI 28.05 kg/m²      Physical Exam  Vitals reviewed.   Constitutional:       Appearance: Normal appearance.   HENT:      Mouth/Throat:      Mouth: Mucous membranes are moist.     Eyes:      General: Lids are normal.       Cardiovascular:      Pulses: Normal pulses.      Heart sounds: Normal heart sounds.   Pulmonary:      Effort: Pulmonary effort is normal.      Breath sounds: Normal breath sounds.   Lymphadenopathy:      Cervical: No cervical adenopathy.     Neurological:      Mental Status: He is alert and oriented to person, place, and time. Mental status is at baseline.     Psychiatric:         Mood and Affect: Mood normal.         Behavior: Behavior normal.         Thought Content: Thought content normal.         Judgment: Judgment normal.         "

## 2025-06-16 DIAGNOSIS — F06.8 ANXIETY DISORDER DUE TO MULTIPLE MEDICAL PROBLEMS: ICD-10-CM

## 2025-06-17 RX ORDER — PILOCARPINE HYDROCHLORIDE 5 MG/1
10 TABLET, FILM COATED ORAL 3 TIMES DAILY
Qty: 540 TABLET | Refills: 0 | Status: SHIPPED | OUTPATIENT
Start: 2025-06-17

## 2025-06-17 RX ORDER — SERTRALINE HYDROCHLORIDE 25 MG/1
25 TABLET, FILM COATED ORAL DAILY
Qty: 90 TABLET | Refills: 0 | Status: SHIPPED | OUTPATIENT
Start: 2025-06-17

## 2025-06-18 DIAGNOSIS — F06.8 ANXIETY DISORDER DUE TO MULTIPLE MEDICAL PROBLEMS: ICD-10-CM

## 2025-06-18 RX ORDER — PILOCARPINE HYDROCHLORIDE 5 MG/1
10 TABLET, FILM COATED ORAL 3 TIMES DAILY
Qty: 540 TABLET | Refills: 0 | OUTPATIENT
Start: 2025-06-18